# Patient Record
Sex: FEMALE | Race: WHITE | Employment: OTHER | URBAN - METROPOLITAN AREA
[De-identification: names, ages, dates, MRNs, and addresses within clinical notes are randomized per-mention and may not be internally consistent; named-entity substitution may affect disease eponyms.]

---

## 2017-06-15 ENCOUNTER — TELEPHONE (OUTPATIENT)
Dept: INTERNAL MEDICINE CLINIC | Age: 42
End: 2017-06-15

## 2017-11-02 ENCOUNTER — OFFICE VISIT (OUTPATIENT)
Dept: BARIATRICS/WEIGHT MGMT | Age: 42
End: 2017-11-02

## 2017-11-02 VITALS
HEIGHT: 64 IN | DIASTOLIC BLOOD PRESSURE: 74 MMHG | HEART RATE: 73 BPM | SYSTOLIC BLOOD PRESSURE: 129 MMHG | WEIGHT: 154.8 LBS | BODY MASS INDEX: 26.43 KG/M2 | RESPIRATION RATE: 16 BRPM

## 2017-11-02 DIAGNOSIS — Z98.84 HISTORY OF ROUX-EN-Y GASTRIC BYPASS: ICD-10-CM

## 2017-11-02 DIAGNOSIS — E66.01 MORBID OBESITY DUE TO EXCESS CALORIES (HCC): ICD-10-CM

## 2017-11-02 DIAGNOSIS — K45.8 INTERNAL HERNIA: Primary | ICD-10-CM

## 2017-11-02 PROCEDURE — 99205 OFFICE O/P NEW HI 60 MIN: CPT | Performed by: SURGERY

## 2017-11-02 PROCEDURE — 1036F TOBACCO NON-USER: CPT | Performed by: SURGERY

## 2017-11-02 PROCEDURE — G8427 DOCREV CUR MEDS BY ELIG CLIN: HCPCS | Performed by: SURGERY

## 2017-11-02 PROCEDURE — G8484 FLU IMMUNIZE NO ADMIN: HCPCS | Performed by: SURGERY

## 2017-11-02 PROCEDURE — G8419 CALC BMI OUT NRM PARAM NOF/U: HCPCS | Performed by: SURGERY

## 2017-11-02 NOTE — PROGRESS NOTES
syndrome     Ketoacidosis     8 times [id number]    MI, old     Neuropathy ([facility]     Obesity     Sleep apnea 11/13/2012    Stroke ([facility]     TIA (transient ischemic attack)     Unspecified sleep apnea     Urinary incontinence    . Review of Systems - ROS  Otherwise per HPI. Allergies: Allergies   Allergen Reactions    Latex     Cephalexin Anaphylaxis    Lyrica [Pregabalin]     Mirapex [Pramipexole] Anaphylaxis    Penicillins Anaphylaxis    Phenergan [Promethazine Hcl]     Prednisone Other (See Comments)     'Roid rage'    Sulfa Antibiotics Anaphylaxis    Ativan [Lorazepam]     Midol [Ibuprofen]     Nsaids      Gastric by-pass    Keflex [Cephalexin] Nausea And Vomiting    Meperidine Nausea And Vomiting     Drops blood pressure    Morphine Nausea And Vomiting    Promethazine Rash    Vicodin [Hydrocodone-Acetaminophen] Nausea And Vomiting    Vistaril [Hydroxyzine Hcl] Anxiety       Medications:  Current Outpatient Prescriptions   Medication Sig Dispense Refill    insulin detemir (LEVEMIR FLEXTOUCH) 100 UNIT/ML injection pen Inject 13 Units into the skin nightly 5 Pen 3    insulin detemir (LEVEMIR) 100 UNIT/ML injection pen Inject 13 Units into the skin every morning 5 Pen 3    clonazePAM (KLONOPIN) 2 MG tablet Take 2 mg by mouth 2 times daily as needed      NEXIUM 40 MG capsule TAKE 1 CAPSULE BY MOUTH EVERY MORNING (BEFORE BREAKFAST). 30 capsule 3    levothyroxine (LEVOTHROID) 50 MCG tablet Take 1 tablet by mouth daily 30 tablet 3    levothyroxine (LEVOTHROID) 200 MCG tablet Take 1 tablet by mouth daily 30 tablet 3    polyethylene glycol (GLYCOLAX) powder TAKE ONE CAPFUL ORALLY IN LIQUID AND DRINK ONCE DAILY.  255 g 3    topiramate (TOPAMAX) 100 MG tablet Take 1 tablet by mouth 2 times daily 60 tablet 3    solifenacin (VESICARE) 10 MG tablet Take 1 tablet by mouth daily 30 tablet 3    insulin aspart (NOVOLOG FLEXPEN) 100 UNIT/ML injection pen Inject 5 Units into the skin 3 times FICS  Member of the [address] [employer]    11/5/2017  6:34 AM

## 2017-11-03 ENCOUNTER — TELEPHONE (OUTPATIENT)
Dept: BARIATRICS/WEIGHT MGMT | Age: 42
End: 2017-11-03

## 2017-11-03 DIAGNOSIS — Z01.818 PREOP EXAMINATION: Primary | ICD-10-CM

## 2017-11-03 DIAGNOSIS — E03.9 HYPOTHYROIDISM, UNSPECIFIED TYPE: ICD-10-CM

## 2017-11-03 DIAGNOSIS — E66.9 DIABETES MELLITUS TYPE 2 IN OBESE (HCC): ICD-10-CM

## 2017-11-03 DIAGNOSIS — E66.01 MORBID OBESITY DUE TO EXCESS CALORIES (HCC): ICD-10-CM

## 2017-11-03 DIAGNOSIS — E11.69 DIABETES MELLITUS TYPE 2 IN OBESE (HCC): ICD-10-CM

## 2017-11-04 PROBLEM — E66.01 MORBID OBESITY DUE TO EXCESS CALORIES (HCC): Status: ACTIVE | Noted: 2017-11-04

## 2017-11-04 PROBLEM — K45.8 INTERNAL HERNIA: Status: ACTIVE | Noted: 2017-11-04

## 2017-11-04 PROBLEM — Z98.84 HISTORY OF ROUX-EN-Y GASTRIC BYPASS: Status: ACTIVE | Noted: 2017-11-04

## 2017-11-13 ENCOUNTER — HOSPITAL ENCOUNTER (OUTPATIENT)
Dept: CT IMAGING | Age: 42
Discharge: HOME | End: 2017-11-13
Attending: SURGERY | Admitting: SURGERY

## 2017-11-13 DIAGNOSIS — K45.8 OTHER SPECIFIED ABDOMINAL HERNIA WITHOUT OBSTRUCTION OR GANGRENE: ICD-10-CM

## 2017-11-13 DIAGNOSIS — K45.8 INTERNAL HERNIA: ICD-10-CM

## 2017-11-13 LAB
GFR AFRICAN AMERICAN: >60 ML/MIN/1.73M2
GFR NON-AFRICAN AMERICAN: >60 ML/MIN/1.73M2
POC CREATININE: 0.5 MG/DL (ref 0.6–1.1)

## 2017-11-13 RX ORDER — 0.9 % SODIUM CHLORIDE 0.9 %
10 VIAL (ML) INJECTION
Status: COMPLETED | OUTPATIENT
Start: 2017-11-13 | End: 2017-11-13

## 2017-11-13 RX ADMIN — Medication 10 ML: at 14:30

## 2017-11-15 ENCOUNTER — TELEPHONE (OUTPATIENT)
Dept: BARIATRICS/WEIGHT MGMT | Age: 42
End: 2017-11-15

## 2017-12-04 ENCOUNTER — OFFICE VISIT (OUTPATIENT)
Dept: BARIATRICS/WEIGHT MGMT | Age: 42
End: 2017-12-04

## 2017-12-04 VITALS
BODY MASS INDEX: 26.63 KG/M2 | WEIGHT: 156 LBS | SYSTOLIC BLOOD PRESSURE: 126 MMHG | RESPIRATION RATE: 12 BRPM | HEIGHT: 64 IN | HEART RATE: 80 BPM | DIASTOLIC BLOOD PRESSURE: 77 MMHG

## 2017-12-04 DIAGNOSIS — K43.6 INCARCERATED VENTRAL HERNIA: Primary | ICD-10-CM

## 2017-12-04 DIAGNOSIS — E66.3 OVERWEIGHT (BMI 25.0-29.9): ICD-10-CM

## 2017-12-04 PROCEDURE — G8427 DOCREV CUR MEDS BY ELIG CLIN: HCPCS | Performed by: SURGERY

## 2017-12-04 PROCEDURE — 99214 OFFICE O/P EST MOD 30 MIN: CPT | Performed by: SURGERY

## 2017-12-04 PROCEDURE — 1036F TOBACCO NON-USER: CPT | Performed by: SURGERY

## 2017-12-04 PROCEDURE — G8419 CALC BMI OUT NRM PARAM NOF/U: HCPCS | Performed by: SURGERY

## 2017-12-04 PROCEDURE — G8484 FLU IMMUNIZE NO ADMIN: HCPCS | Performed by: SURGERY

## 2017-12-04 NOTE — PROGRESS NOTES
bowel syndrome     Ketoacidosis     8 times [id number]    MI, old     Neuropathy ([facility] [address] [facility]     Obesity     Sleep apnea 11/13/2012    Stroke ([facility] [facility]     TIA (transient ischemic attack)     Unspecified sleep apnea     Urinary incontinence       Past Surgical History:   Procedure Laterality Date    BLADDER SURGERY      has a bladder stimulator    CHOLECYSTECTOMY      COLONOSCOPY  1/5/2016    poor colonic preparation throughout    COLONOSCOPY  1/19/2016    int hem    ENDOMETRIAL ABLATION      ENDOSCOPY, COLON, DIAGNOSTIC  1/5/2016    normal appearing cesia-en-y, biopsies to rule our celiac sprue    GASTRIC BYPASS SURGERY  3/11/2013    HERNIA REPAIR      KNEE ARTHROSCOPY Left     KNEE SURGERY      SMALL INTESTINE SURGERY      TUBAL LIGATION      UPPER GASTROINTESTINAL ENDOSCOPY       Current Outpatient Prescriptions   Medication Sig Dispense Refill    insulin detemir (LEVEMIR FLEXTOUCH) 100 UNIT/ML injection pen Inject 13 Units into the skin nightly 5 Pen 3    insulin detemir (LEVEMIR) 100 UNIT/ML injection pen Inject 13 Units into the skin every morning 5 Pen 3    clonazePAM (KLONOPIN) 2 MG tablet Take 2 mg by mouth 2 times daily as needed      NEXIUM 40 MG capsule TAKE 1 CAPSULE BY MOUTH EVERY MORNING (BEFORE BREAKFAST). 30 capsule 3    levothyroxine (LEVOTHROID) 50 MCG tablet Take 1 tablet by mouth daily 30 tablet 3    levothyroxine (LEVOTHROID) 200 MCG tablet Take 1 tablet by mouth daily 30 tablet 3    polyethylene glycol (GLYCOLAX) powder TAKE ONE CAPFUL ORALLY IN LIQUID AND DRINK ONCE DAILY.  255 g 3    topiramate (TOPAMAX) 100 MG tablet Take 1 tablet by mouth 2 times daily 60 tablet 3    solifenacin (VESICARE) 10 MG tablet Take 1 tablet by mouth daily 30 tablet 3    insulin aspart (NOVOLOG FLEXPEN) 100 UNIT/ML injection pen Inject 5 Units into the skin 3 times daily (before meals) Plus sliding scale 4 Pen 6    erythromycin with ethanol (THERAMYCIN) 2 % external solution Apply topically difficulty, light-headedness and numbness. Hematological: Negative for adenopathy. Does not bruise/bleed easily. OBJECTIVE:    /77 (Site: Right Arm, Position: Sitting, Cuff Size: Large Adult)   Pulse 80   Resp 12   Ht 5' 4\" (1.626 m)   Wt 156 lb (70.8 kg)   BMI 26.78 kg/m²      Physical Exam   Constitutional: She is oriented to person, place, and time. She appears well-developed and well-nourished. No distress. HENT:   Head: Normocephalic and atraumatic. Eyes: Conjunctivae and EOM are normal. Pupils are equal, round, and reactive to light. No scleral icterus. Neck: Normal range of motion. No JVD present. No tracheal deviation present. No thyromegaly present. Cardiovascular: Normal rate, regular rhythm, normal heart sounds and intact distal pulses. Exam reveals no gallop and no friction rub. No murmur heard. Pulmonary/Chest: Effort normal. No stridor. No respiratory distress. She has no wheezes. She has no rales. She exhibits no tenderness. Abdominal: Soft. Bowel sounds are normal. She exhibits mass. She exhibits no distension. There is tenderness. There is no rebound and no guarding. Musculoskeletal: Normal range of motion. She exhibits no edema or tenderness. Lymphadenopathy:     She has no cervical adenopathy. Neurological: She is alert and oriented to person, place, and time. No cranial nerve deficit. Coordination normal.   Skin: Skin is warm and dry. No rash noted. She is not diaphoretic. No erythema. No pallor. Psychiatric: Her behavior is normal. Judgment and thought content normal.   Vitals reviewed. ASSESSMENT & PLAN:    1. Incarcerated ventral hernia    2. Overweight (BMI 25.0-29. 9)           VENTRAL INCISIONAL HERNIA with incarcerated intestinal contents - will proceed with incisional VH repair with mesh. Patient counseled on the risks, benefits, and alternatives of treatment plan at length while in the office today.  Patient states an understanding and willingness to proceed with the plan. Follow Up:  Return in about 4 weeks (around 1/1/2018) for Surgery. Delmi Vasques MD, FACS, FICS.     12/4/17

## 2017-12-08 ASSESSMENT — ENCOUNTER SYMPTOMS
WHEEZING: 0
VOICE CHANGE: 0
PHOTOPHOBIA: 0
COLOR CHANGE: 0
SORE THROAT: 0
CONSTIPATION: 0
SHORTNESS OF BREATH: 0
ANAL BLEEDING: 0
TROUBLE SWALLOWING: 0
DIARRHEA: 0
COUGH: 0
VOMITING: 0
BLOOD IN STOOL: 0
NAUSEA: 0
ABDOMINAL PAIN: 1

## 2017-12-20 ENCOUNTER — TELEPHONE (OUTPATIENT)
Dept: BARIATRICS/WEIGHT MGMT | Age: 42
End: 2017-12-20

## 2017-12-20 NOTE — TELEPHONE ENCOUNTER
Called patient advised procedure scheduled 1/29. pretesting 1/22/18 at 1pm. Patient voiced understanding.

## 2018-01-09 LAB
AVERAGE GLUCOSE: NORMAL
HBA1C MFR BLD: 8 %

## 2018-01-10 LAB
CREATININE URINE: NORMAL MG/DL
MICROALBUMIN/CREAT 24H UR: 24.2 MG/G{CREAT}

## 2018-01-22 ENCOUNTER — HOSPITAL ENCOUNTER (OUTPATIENT)
Dept: PREADMISSION TESTING | Age: 43
Discharge: HOME | End: 2018-01-22
Attending: SURGERY | Admitting: SURGERY

## 2018-01-22 VITALS
DIASTOLIC BLOOD PRESSURE: 63 MMHG | HEIGHT: 64 IN | SYSTOLIC BLOOD PRESSURE: 134 MMHG | HEART RATE: 79 BPM | TEMPERATURE: 99.4 F | BODY MASS INDEX: 25.61 KG/M2 | RESPIRATION RATE: 16 BRPM | WEIGHT: 150 LBS | OXYGEN SATURATION: 100 %

## 2018-01-22 LAB
ANION GAP SERPL CALCULATED.3IONS-SCNC: 12 MMOL/L (ref 4–16)
BUN BLDV-MCNC: 11 MG/DL (ref 6–23)
CALCIUM SERPL-MCNC: 9.1 MG/DL (ref 8.3–10.6)
CHLORIDE BLD-SCNC: 101 MMOL/L (ref 99–110)
CO2: 22 MMOL/L (ref 21–32)
CREAT SERPL-MCNC: 0.7 MG/DL (ref 0.6–1.1)
EKG ATRIAL RATE: 66 BPM
EKG DIAGNOSIS: NORMAL
EKG P AXIS: 65 DEGREES
EKG P-R INTERVAL: 146 MS
EKG Q-T INTERVAL: 412 MS
EKG QRS DURATION: 86 MS
EKG QTC CALCULATION (BAZETT): 431 MS
EKG R AXIS: 11 DEGREES
EKG T AXIS: 49 DEGREES
EKG VENTRICULAR RATE: 66 BPM
GFR AFRICAN AMERICAN: >60 ML/MIN/1.73M2
GFR NON-AFRICAN AMERICAN: >60 ML/MIN/1.73M2
GLUCOSE BLD-MCNC: 251 MG/DL (ref 70–99)
HCT VFR BLD CALC: 35 % (ref 37–47)
HEMOGLOBIN: 12.1 GM/DL (ref 12.5–16)
MCH RBC QN AUTO: 30.9 PG (ref 27–31)
MCHC RBC AUTO-ENTMCNC: 34.6 % (ref 32–36)
MCV RBC AUTO: 89.5 FL (ref 78–100)
PDW BLD-RTO: 11.5 % (ref 11.7–14.9)
PLATELET # BLD: 233 K/CU MM (ref 140–440)
PMV BLD AUTO: 9.5 FL (ref 7.5–11.1)
POTASSIUM SERPL-SCNC: 4.2 MMOL/L (ref 3.5–5.1)
RBC # BLD: 3.91 M/CU MM (ref 4.2–5.4)
SODIUM BLD-SCNC: 135 MMOL/L (ref 135–145)
WBC # BLD: 5.5 K/CU MM (ref 4–10.5)

## 2018-01-22 RX ORDER — CALCIUM CARBONATE 500(1250)
1500 TABLET ORAL DAILY
COMMUNITY
End: 2022-06-20

## 2018-01-22 RX ORDER — OXCARBAZEPINE 600 MG/1
600 TABLET, FILM COATED ORAL 2 TIMES DAILY
COMMUNITY

## 2018-01-22 RX ORDER — MELATONIN 10 MG
1 TABLET, SUBLINGUAL SUBLINGUAL DAILY
COMMUNITY

## 2018-01-22 RX ORDER — PENTOXIFYLLINE 400 MG/1
400 TABLET, EXTENDED RELEASE ORAL
COMMUNITY
End: 2022-09-19 | Stop reason: SDUPTHER

## 2018-01-22 RX ORDER — BUSPIRONE HYDROCHLORIDE 10 MG/1
10 TABLET ORAL 2 TIMES DAILY
COMMUNITY
End: 2022-06-20

## 2018-01-22 RX ORDER — LEVOTHYROXINE SODIUM 0.05 MG/1
50 TABLET ORAL DAILY
COMMUNITY
End: 2022-06-20

## 2018-01-22 RX ORDER — VANCOMYCIN HYDROCHLORIDE 1 G/200ML
1000 INJECTION, SOLUTION INTRAVENOUS ONCE
Status: CANCELLED | OUTPATIENT
Start: 2018-01-29

## 2018-01-22 RX ORDER — LEVOTHYROXINE SODIUM 175 UG/1
175 TABLET ORAL DAILY
COMMUNITY
End: 2022-06-20 | Stop reason: SDUPTHER

## 2018-01-22 RX ORDER — EVENING PRIMROSE OIL 500 MG
1 CAPSULE ORAL DAILY
COMMUNITY
End: 2022-06-20

## 2018-01-22 ASSESSMENT — PAIN DESCRIPTION - FREQUENCY: FREQUENCY: CONTINUOUS

## 2018-01-22 ASSESSMENT — PAIN DESCRIPTION - PAIN TYPE: TYPE: CHRONIC PAIN

## 2018-01-22 ASSESSMENT — PAIN SCALES - GENERAL: PAINLEVEL_OUTOF10: 4

## 2018-01-30 ENCOUNTER — TELEPHONE (OUTPATIENT)
Dept: BARIATRICS/WEIGHT MGMT | Age: 43
End: 2018-01-30

## 2018-01-30 DIAGNOSIS — Z98.890 S/P REPAIR OF VENTRAL HERNIA: Primary | ICD-10-CM

## 2018-01-30 DIAGNOSIS — Z87.19 S/P REPAIR OF VENTRAL HERNIA: Primary | ICD-10-CM

## 2018-01-30 RX ORDER — OXYCODONE HYDROCHLORIDE 15 MG/1
1 TABLET, FILM COATED, EXTENDED RELEASE ORAL EVERY 12 HOURS
Qty: 4 TABLET | Refills: 0 | Status: SHIPPED | OUTPATIENT
Start: 2018-01-30 | End: 2018-02-01

## 2018-01-30 NOTE — PROGRESS NOTES
Patient called in stating her incisional pain is not controlled with percocet 2 tabs every 6 hours. No new pain, incisions healing well and no n/v. Spoke with Dr. Madison Goode, instructed to give patient oxycontin 15 mg BID and he will see patient tomorrow. Patient was instructed to STOP taking percocet's and instructed on dosage of oxycontin and to only take as prescribed. Patient verbalizes and picked up prescription. Controlled Substances Monitoring:     Attestation: The Prescription Monitoring Report for this patient was reviewed today. Allyson Villagran CNP)  Documentation: Possible medication side effects, risk of tolerance and/or dependence, and alternative treatments discussed. , Potential drug abuse or diversion identified, see note documentation., Obtaining appropriate analgesic effect of treatment., No signs of potential drug abuse or diversion identified. Allyson Villagran CNP)  Acute Pain Prescriptions: Severe pain not adequately treated with lower dose.  Allyson Villagran CNP)

## 2018-01-31 ENCOUNTER — OFFICE VISIT (OUTPATIENT)
Dept: BARIATRICS/WEIGHT MGMT | Age: 43
End: 2018-01-31

## 2018-01-31 VITALS
OXYGEN SATURATION: 97 % | BODY MASS INDEX: 25.74 KG/M2 | WEIGHT: 154.5 LBS | HEART RATE: 91 BPM | DIASTOLIC BLOOD PRESSURE: 64 MMHG | SYSTOLIC BLOOD PRESSURE: 112 MMHG | HEIGHT: 65 IN

## 2018-01-31 DIAGNOSIS — Z98.890 S/P HERNIA REPAIR: Primary | ICD-10-CM

## 2018-01-31 DIAGNOSIS — K43.9 VENTRAL HERNIA WITHOUT OBSTRUCTION OR GANGRENE: ICD-10-CM

## 2018-01-31 DIAGNOSIS — Z87.19 S/P HERNIA REPAIR: Primary | ICD-10-CM

## 2018-01-31 PROCEDURE — 99024 POSTOP FOLLOW-UP VISIT: CPT | Performed by: SURGERY

## 2018-01-31 RX ORDER — OXYCODONE HYDROCHLORIDE 30 MG/1
30 TABLET, FILM COATED, EXTENDED RELEASE ORAL EVERY 12 HOURS
Qty: 14 EACH | Refills: 0 | Status: SHIPPED | OUTPATIENT
Start: 2018-01-31 | End: 2018-02-07

## 2018-01-31 RX ORDER — OXYCODONE AND ACETAMINOPHEN 7.5; 325 MG/1; MG/1
1 TABLET ORAL EVERY 4 HOURS PRN
Qty: 20 TABLET | Refills: 0 | Status: SHIPPED | OUTPATIENT
Start: 2018-01-31 | End: 2018-02-07

## 2018-02-14 ENCOUNTER — OFFICE VISIT (OUTPATIENT)
Dept: BARIATRICS/WEIGHT MGMT | Age: 43
End: 2018-02-14

## 2018-02-14 VITALS
HEIGHT: 64 IN | BODY MASS INDEX: 24.7 KG/M2 | WEIGHT: 144.7 LBS | SYSTOLIC BLOOD PRESSURE: 110 MMHG | DIASTOLIC BLOOD PRESSURE: 64 MMHG

## 2018-02-14 DIAGNOSIS — Z87.19 S/P VENTRAL HERNIORRHAPHY: Primary | ICD-10-CM

## 2018-02-14 DIAGNOSIS — Z98.890 S/P VENTRAL HERNIORRHAPHY: Primary | ICD-10-CM

## 2018-02-14 PROCEDURE — 99024 POSTOP FOLLOW-UP VISIT: CPT | Performed by: SURGERY

## 2018-02-14 NOTE — PROGRESS NOTES
preparation throughout    COLONOSCOPY  1/19/2016    int hem    ENDOMETRIAL ABLATION  2015    ENDOSCOPY, COLON, DIAGNOSTIC  1/5/2016    normal appearing adrian-en-y, biopsies to rule out celiac sprue    GASTRIC BYPASS SURGERY  03/11/2013    Adrian-en-y surgery    HERNIA REPAIR      KNEE ARTHROSCOPY Left 2014    LIPECTOMY  2015    SMALL INTESTINE SURGERY  05/2016    internal hernia surgery then bowel resection and then abscess drainage --all in same month   [facility]  01/29/2018    Robotic laparoscopic ventral hernia repair with mesh    WISDOM TOOTH EXTRACTION       Current Outpatient Prescriptions   Medication Sig Dispense Refill    OXcarbazepine (TRILEPTAL) 600 MG tablet Take 600 mg by mouth 2 times daily      topiramate (TOPAMAX) 200 MG tablet Take 200 mg by mouth 3 times daily      Insulin Regular Human (AFREZZA) 4 units POWD Inhale 1 Cartridge into the lungs 4 times daily      Insulin Regular Human (AFREZZA) 12 units POWD Inhale 1 Cartridge into the lungs 4 times daily Pt takes only 8 units at dinner time and adjusts dose at bedtime due to bld sugar      Cholecalciferol (VITAMIN D3) 64226 units TABS Take 1 tablet by mouth daily      levothyroxine (SYNTHROID) 175 MCG tablet Take 175 mcg by mouth Daily      levothyroxine (SYNTHROID) 50 MCG tablet Take 50 mcg by mouth Daily      Biotin 19711 MCG TBDP Take 1 tablet by mouth daily      Evening Primrose Oil 500 MG CAPS Take 1 capsule by mouth daily      pentoxifylline (TRENTAL) 400 MG extended release tablet Take 400 mg by mouth 3 times daily (with meals)      calcium carbonate (OSCAL) 500 MG TABS tablet Take 1,500 mg by mouth daily      busPIRone (BUSPAR) 10 MG tablet Take 10 mg by mouth 2 times daily      insulin detemir (LEVEMIR FLEXTOUCH) 100 UNIT/ML injection pen Inject 13 Units into the skin nightly (Patient taking differently: Inject 8 Units into the skin 2 times

## 2018-03-05 ENCOUNTER — TELEPHONE (OUTPATIENT)
Dept: BARIATRICS/WEIGHT MGMT | Age: 43
End: 2018-03-05

## 2018-03-07 ENCOUNTER — OFFICE VISIT (OUTPATIENT)
Dept: BARIATRICS/WEIGHT MGMT | Age: 43
End: 2018-03-07

## 2018-03-07 VITALS
WEIGHT: 145.7 LBS | SYSTOLIC BLOOD PRESSURE: 114 MMHG | DIASTOLIC BLOOD PRESSURE: 66 MMHG | BODY MASS INDEX: 24.87 KG/M2 | HEART RATE: 80 BPM | HEIGHT: 64 IN

## 2018-03-07 DIAGNOSIS — R10.12 LEFT UPPER QUADRANT PAIN: Primary | ICD-10-CM

## 2018-03-07 PROCEDURE — 99024 POSTOP FOLLOW-UP VISIT: CPT | Performed by: SURGERY

## 2018-03-07 RX ORDER — ONDANSETRON 8 MG/1
8 TABLET, ORALLY DISINTEGRATING ORAL EVERY 8 HOURS PRN
Qty: 30 TABLET | Refills: 2 | Status: SHIPPED | OUTPATIENT
Start: 2018-03-07 | End: 2022-06-20

## 2018-03-07 RX ORDER — OXYCODONE HYDROCHLORIDE AND ACETAMINOPHEN 5; 325 MG/1; MG/1
1-2 TABLET ORAL EVERY 6 HOURS PRN
Qty: 25 TABLET | Refills: 0 | Status: SHIPPED | OUTPATIENT
Start: 2018-03-07 | End: 2018-03-14

## 2018-03-07 ASSESSMENT — ENCOUNTER SYMPTOMS
PHOTOPHOBIA: 0
VOICE CHANGE: 0
DIARRHEA: 0
COLOR CHANGE: 0
SORE THROAT: 0
BACK PAIN: 1
BLOOD IN STOOL: 0
ABDOMINAL DISTENTION: 1
SHORTNESS OF BREATH: 1
COUGH: 0
WHEEZING: 0
CONSTIPATION: 0
NAUSEA: 0
ANAL BLEEDING: 0
VOMITING: 0
ABDOMINAL PAIN: 1
TROUBLE SWALLOWING: 0

## 2018-03-07 NOTE — PROGRESS NOTES
Units into the skin nightly (Patient taking differently: Inject 8 Units into the skin 2 times daily 8 units at 9 AM and 10 units at 9pm) 5 Pen 3    clonazePAM (KLONOPIN) 2 MG tablet Take 2 mg by mouth 2 times daily as needed      NEXIUM 40 MG capsule TAKE 1 CAPSULE BY MOUTH EVERY MORNING (BEFORE BREAKFAST). (Patient taking differently: Take 40 mg by mouth every morning (before breakfast) TAKE 1 CAPSULE BY MOUTH EVERY MORNING (BEFORE BREAKFAST). ) 30 capsule 3    polyethylene glycol (GLYCOLAX) powder TAKE ONE CAPFUL ORALLY IN LIQUID AND DRINK ONCE DAILY. 255 g 3    solifenacin (VESICARE) 10 MG tablet Take 1 tablet by mouth daily 30 tablet 3    Cyanocobalamin (VITAMIN B-12) 500 MCG SUBL Place 1 tablet under the tongue daily.  paroxetine (PAXIL) 40 MG tablet Take 40 mg by mouth every morning        No current facility-administered medications for this visit. Allergies   Allergen Reactions    Latex     Cephalexin Anaphylaxis    Lyrica [Pregabalin]     Mirapex [Pramipexole] Anaphylaxis    Penicillins Anaphylaxis    Prednisone Other (See Comments)     'Roid rage'  And severe bld sugar elevation    Sulfa Antibiotics Anaphylaxis    Midol [Ibuprofen] Other (See Comments)     Ataxia--reacted with Trileptal--couldn't walk    Phenergan [Promethazine Hcl] Other (See Comments)     Violent body aches    Ativan [Lorazepam] Other (See Comments)     Causes manic behavior    Keflex [Cephalexin] Nausea And Vomiting    Meperidine Nausea And Vomiting     Drops blood pressure    Morphine Nausea And Vomiting    Nsaids Other (See Comments)     Gastric by-pass    Vicodin [Hydrocodone-Acetaminophen] Nausea And Vomiting    Vistaril [Hydroxyzine Hcl] Anxiety           Review of Systems   Constitutional: Positive for fatigue. Negative for activity change, chills, diaphoresis and fever. HENT: Negative for sore throat, trouble swallowing and voice change. Eyes: Negative for photophobia and visual disturbance. plan.

## 2018-03-28 ENCOUNTER — OFFICE VISIT (OUTPATIENT)
Dept: BARIATRICS/WEIGHT MGMT | Age: 43
End: 2018-03-28

## 2018-03-28 VITALS
HEART RATE: 76 BPM | SYSTOLIC BLOOD PRESSURE: 103 MMHG | WEIGHT: 143.3 LBS | OXYGEN SATURATION: 98 % | DIASTOLIC BLOOD PRESSURE: 67 MMHG | BODY MASS INDEX: 24.46 KG/M2 | HEIGHT: 64 IN

## 2018-03-28 DIAGNOSIS — R10.12 LUQ PAIN: Primary | ICD-10-CM

## 2018-03-28 PROCEDURE — 99024 POSTOP FOLLOW-UP VISIT: CPT | Performed by: SURGERY

## 2018-03-28 RX ORDER — ONDANSETRON 4 MG/1
4 TABLET, ORALLY DISINTEGRATING ORAL EVERY 4 HOURS PRN
Qty: 30 TABLET | Refills: 3 | Status: SHIPPED | OUTPATIENT
Start: 2018-03-28 | End: 2022-10-18 | Stop reason: SDUPTHER

## 2018-05-04 ENCOUNTER — HOSPITAL ENCOUNTER (OUTPATIENT)
Dept: CT IMAGING | Age: 43
Discharge: HOME | End: 2018-05-04
Attending: FAMILY MEDICINE | Admitting: FAMILY MEDICINE

## 2018-05-04 DIAGNOSIS — G93.40 ENCEPHALOPATHY: ICD-10-CM

## 2018-05-04 LAB
GFR AFRICAN AMERICAN: >60 ML/MIN/1.73M2
GFR NON-AFRICAN AMERICAN: >60 ML/MIN/1.73M2
POC CREATININE: 0.6 MG/DL (ref 0.6–1.1)

## 2018-05-04 RX ORDER — SODIUM CHLORIDE 9 MG/ML
100 INJECTION, SOLUTION INTRAVENOUS ONCE
Status: COMPLETED | OUTPATIENT
Start: 2018-05-04 | End: 2018-05-04

## 2018-05-04 RX ORDER — SODIUM CHLORIDE 0.9 % (FLUSH) 0.9 %
10 SYRINGE (ML) INJECTION ONCE
Status: COMPLETED | OUTPATIENT
Start: 2018-05-04 | End: 2018-05-04

## 2018-05-04 RX ADMIN — Medication 10 ML: at 14:37

## 2018-05-04 RX ADMIN — SODIUM CHLORIDE 100 ML/HR: 9 INJECTION, SOLUTION INTRAVENOUS at 14:38

## 2018-05-08 ENCOUNTER — HOSPITAL ENCOUNTER (OUTPATIENT)
Dept: OTHER | Age: 43
Discharge: HOME | End: 2018-05-08
Attending: INTERNAL MEDICINE | Admitting: INTERNAL MEDICINE

## 2018-05-09 ENCOUNTER — HOSPITAL ENCOUNTER (OUTPATIENT)
Dept: LAB | Age: 43
Discharge: HOME | End: 2018-05-09
Attending: INTERNAL MEDICINE | Admitting: INTERNAL MEDICINE

## 2018-05-09 LAB
ALBUMIN SERPL-MCNC: 4.2 GM/DL (ref 3.4–5)
ALP BLD-CCNC: 89 IU/L (ref 40–128)
ALT SERPL-CCNC: 43 U/L (ref 10–40)
ANION GAP SERPL CALCULATED.3IONS-SCNC: 13 MMOL/L (ref 4–16)
AST SERPL-CCNC: 30 IU/L (ref 15–37)
BACTERIA: NEGATIVE /HPF
BILIRUB SERPL-MCNC: 0.2 MG/DL (ref 0–1)
BILIRUBIN URINE: NEGATIVE MG/DL
BLOOD, URINE: NEGATIVE
BUN BLDV-MCNC: 13 MG/DL (ref 6–23)
CALCIUM SERPL-MCNC: 9.1 MG/DL (ref 8.3–10.6)
CHLORIDE BLD-SCNC: 107 MMOL/L (ref 99–110)
CLARITY: CLEAR
CO2: 20 MMOL/L (ref 21–32)
COLOR: YELLOW
CREAT SERPL-MCNC: 0.8 MG/DL (ref 0.6–1.1)
GFR AFRICAN AMERICAN: >60 ML/MIN/1.73M2
GFR NON-AFRICAN AMERICAN: >60 ML/MIN/1.73M2
GLUCOSE BLD-MCNC: 119 MG/DL (ref 70–99)
GLUCOSE, URINE: NEGATIVE MG/DL
HYALINE CASTS: 0 /LPF
KETONES, URINE: NEGATIVE MG/DL
LEUKOCYTE ESTERASE, URINE: ABNORMAL
Lab: 24 HRS
MUCUS: ABNORMAL HPF
NITRITE URINE, QUANTITATIVE: NEGATIVE
PH, URINE: 5 (ref 5–8)
POTASSIUM SERPL-SCNC: 3.6 MMOL/L (ref 3.5–5.1)
PROTEIN 24 HOUR URINE: 81 MG/24 HR
PROTEIN UA: NEGATIVE MG/DL
RBC URINE: ABNORMAL /HPF (ref 0–6)
SODIUM BLD-SCNC: 140 MMOL/L (ref 135–145)
SPECIFIC GRAVITY UA: 1.02 (ref 1–1.03)
SQUAMOUS EPITHELIAL: 1 /HPF
TOTAL PROTEIN: 6.4 GM/DL (ref 6.4–8.2)
TRICHOMONAS: ABNORMAL /HPF
UROBILINOGEN, URINE: NORMAL MG/DL (ref 0.2–1)
VOLUME, (UVOL): 400 MLS
WBC UA: <1 /HPF (ref 0–5)

## 2020-05-27 ENCOUNTER — HOSPITAL ENCOUNTER (OUTPATIENT)
Age: 45
Discharge: HOME | End: 2020-05-27
Payer: MEDICARE

## 2020-05-27 LAB
ALBUMIN SERPL-MCNC: 4 GM/DL (ref 3.4–5)
ANION GAP SERPL CALCULATED.3IONS-SCNC: 13 MMOL/L (ref 4–16)
BACTERIA: NEGATIVE /HPF
BILIRUBIN URINE: NEGATIVE MG/DL
BLOOD, URINE: NEGATIVE
BUN BLDV-MCNC: 12 MG/DL (ref 6–23)
CALCIUM SERPL-MCNC: 9.2 MG/DL (ref 8.3–10.6)
CHLORIDE BLD-SCNC: 105 MMOL/L (ref 99–110)
CLARITY: CLEAR
CO2: 23 MMOL/L (ref 21–32)
COLOR: YELLOW
CREAT SERPL-MCNC: 0.7 MG/DL (ref 0.6–1.1)
GFR AFRICAN AMERICAN: >60 ML/MIN/1.73M2
GFR NON-AFRICAN AMERICAN: >60 ML/MIN/1.73M2
GLUCOSE BLD-MCNC: 119 MG/DL (ref 70–99)
GLUCOSE, URINE: NEGATIVE MG/DL
KETONES, URINE: NEGATIVE MG/DL
LEUKOCYTE ESTERASE, URINE: ABNORMAL
MUCUS: ABNORMAL HPF
NITRITE URINE, QUANTITATIVE: NEGATIVE
PH, URINE: 7 (ref 5–8)
PHOSPHORUS: 3.6 MG/DL (ref 2.5–4.9)
POTASSIUM SERPL-SCNC: 4.4 MMOL/L (ref 3.5–5.1)
PROTEIN UA: 30 MG/DL
RBC URINE: <1 /HPF (ref 0–6)
SODIUM BLD-SCNC: 141 MMOL/L (ref 135–145)
SPECIFIC GRAVITY UA: 1.02 (ref 1–1.03)
SQUAMOUS EPITHELIAL: <1 /HPF
TRICHOMONAS: ABNORMAL /HPF
UROBILINOGEN, URINE: NORMAL MG/DL (ref 0.2–1)
WBC UA: <1 /HPF (ref 0–5)

## 2020-05-27 PROCEDURE — 81001 URINALYSIS AUTO W/SCOPE: CPT

## 2020-05-27 PROCEDURE — 36415 COLL VENOUS BLD VENIPUNCTURE: CPT

## 2020-05-27 PROCEDURE — 80069 RENAL FUNCTION PANEL: CPT

## 2021-02-24 ENCOUNTER — HOSPITAL ENCOUNTER (OUTPATIENT)
Age: 46
Discharge: HOME | End: 2021-02-24
Payer: MEDICARE

## 2021-02-24 LAB
ALBUMIN SERPL-MCNC: 3.6 GM/DL (ref 3.4–5)
ALP BLD-CCNC: 80 IU/L (ref 40–129)
ALT SERPL-CCNC: 13 U/L (ref 10–40)
ANION GAP SERPL CALCULATED.3IONS-SCNC: 8 MMOL/L (ref 4–16)
AST SERPL-CCNC: 15 IU/L (ref 15–37)
BASOPHILS ABSOLUTE: 0.1 K/CU MM
BASOPHILS RELATIVE PERCENT: 0.7 % (ref 0–1)
BILIRUB SERPL-MCNC: 0.2 MG/DL (ref 0–1)
BUN BLDV-MCNC: 12 MG/DL (ref 6–23)
CALCIUM SERPL-MCNC: 9 MG/DL (ref 8.3–10.6)
CHLORIDE BLD-SCNC: 108 MMOL/L (ref 99–110)
CO2: 22 MMOL/L (ref 21–32)
CREAT SERPL-MCNC: 0.6 MG/DL (ref 0.6–1.1)
DIFFERENTIAL TYPE: ABNORMAL
EOSINOPHILS ABSOLUTE: 0.1 K/CU MM
EOSINOPHILS RELATIVE PERCENT: 1 % (ref 0–3)
GFR AFRICAN AMERICAN: >60 ML/MIN/1.73M2
GFR NON-AFRICAN AMERICAN: >60 ML/MIN/1.73M2
GLUCOSE BLD-MCNC: 172 MG/DL (ref 70–99)
HCT VFR BLD CALC: 36.7 % (ref 37–47)
HEMOGLOBIN: 12.3 GM/DL (ref 12.5–16)
IMMATURE NEUTROPHIL %: 0.3 % (ref 0–0.43)
LYMPHOCYTES ABSOLUTE: 2.8 K/CU MM
LYMPHOCYTES RELATIVE PERCENT: 40.9 % (ref 24–44)
MCH RBC QN AUTO: 30.1 PG (ref 27–31)
MCHC RBC AUTO-ENTMCNC: 33.5 % (ref 32–36)
MCV RBC AUTO: 90 FL (ref 78–100)
MONOCYTES ABSOLUTE: 0.4 K/CU MM
MONOCYTES RELATIVE PERCENT: 6 % (ref 0–4)
NUCLEATED RBC %: 0 %
PDW BLD-RTO: 11.6 % (ref 11.7–14.9)
PLATELET # BLD: 283 K/CU MM (ref 140–440)
PMV BLD AUTO: 10.7 FL (ref 7.5–11.1)
POTASSIUM SERPL-SCNC: 4.2 MMOL/L (ref 3.5–5.1)
RBC # BLD: 4.08 M/CU MM (ref 4.2–5.4)
SEGMENTED NEUTROPHILS ABSOLUTE COUNT: 3.5 K/CU MM
SEGMENTED NEUTROPHILS RELATIVE PERCENT: 51.1 % (ref 36–66)
SODIUM BLD-SCNC: 138 MMOL/L (ref 135–145)
TOTAL IMMATURE NEUTOROPHIL: 0.02 K/CU MM
TOTAL NUCLEATED RBC: 0 K/CU MM
TOTAL PROTEIN: 6.1 GM/DL (ref 6.4–8.2)
WBC # BLD: 6.9 K/CU MM (ref 4–10.5)

## 2021-02-24 PROCEDURE — 36415 COLL VENOUS BLD VENIPUNCTURE: CPT

## 2021-02-24 PROCEDURE — 87086 URINE CULTURE/COLONY COUNT: CPT

## 2021-02-24 PROCEDURE — 80053 COMPREHEN METABOLIC PANEL: CPT

## 2021-02-24 PROCEDURE — 85025 COMPLETE CBC W/AUTO DIFF WBC: CPT

## 2021-02-25 LAB
CULTURE: NORMAL
Lab: NORMAL
SPECIMEN: NORMAL

## 2021-07-27 PROBLEM — E10.21 DIABETIC NEPHROPATHY ASSOCIATED WITH TYPE 1 DIABETES MELLITUS (HCC): Status: ACTIVE | Noted: 2021-07-27

## 2021-07-27 PROBLEM — R80.1 PERSISTENT PROTEINURIA: Status: ACTIVE | Noted: 2021-07-27

## 2022-01-11 ENCOUNTER — HOSPITAL ENCOUNTER (OUTPATIENT)
Age: 47
Discharge: HOME | End: 2022-01-11
Payer: MEDICARE

## 2022-01-11 DIAGNOSIS — R80.1 PERSISTENT PROTEINURIA: ICD-10-CM

## 2022-01-11 DIAGNOSIS — E10.21 DIABETIC NEPHROPATHY ASSOCIATED WITH TYPE 1 DIABETES MELLITUS (HCC): ICD-10-CM

## 2022-01-11 LAB
ALBUMIN SERPL-MCNC: 3.9 GM/DL (ref 3.4–5)
ALP BLD-CCNC: 118 IU/L (ref 40–128)
ALT SERPL-CCNC: 34 U/L (ref 10–40)
ANION GAP SERPL CALCULATED.3IONS-SCNC: 8 MMOL/L (ref 4–16)
AST SERPL-CCNC: 39 IU/L (ref 15–37)
BILIRUB SERPL-MCNC: 0.2 MG/DL (ref 0–1)
BUN BLDV-MCNC: 12 MG/DL (ref 6–23)
CALCIUM SERPL-MCNC: 8.7 MG/DL (ref 8.3–10.6)
CHLORIDE BLD-SCNC: 109 MMOL/L (ref 99–110)
CO2: 24 MMOL/L (ref 21–32)
CREAT SERPL-MCNC: 0.6 MG/DL (ref 0.6–1.1)
CREATININE URINE: 73.7 MG/DL (ref 28–217)
GFR AFRICAN AMERICAN: >60 ML/MIN/1.73M2
GFR NON-AFRICAN AMERICAN: >60 ML/MIN/1.73M2
GLUCOSE BLD-MCNC: 86 MG/DL (ref 70–99)
POTASSIUM SERPL-SCNC: 4.2 MMOL/L (ref 3.5–5.1)
PROT/CREAT RATIO, UR: 0.2
SODIUM BLD-SCNC: 141 MMOL/L (ref 135–145)
TOTAL PROTEIN: 6.1 GM/DL (ref 6.4–8.2)
URINE TOTAL PROTEIN: 15.6 MG/DL

## 2022-01-11 PROCEDURE — 82570 ASSAY OF URINE CREATININE: CPT

## 2022-01-11 PROCEDURE — 84156 ASSAY OF PROTEIN URINE: CPT

## 2022-01-11 PROCEDURE — 80053 COMPREHEN METABOLIC PANEL: CPT

## 2022-01-11 PROCEDURE — 36415 COLL VENOUS BLD VENIPUNCTURE: CPT

## 2022-06-20 ENCOUNTER — OFFICE VISIT (OUTPATIENT)
Dept: INTERNAL MEDICINE CLINIC | Age: 47
End: 2022-06-20
Payer: MEDICARE

## 2022-06-20 VITALS
DIASTOLIC BLOOD PRESSURE: 72 MMHG | HEIGHT: 64 IN | BODY MASS INDEX: 21.85 KG/M2 | OXYGEN SATURATION: 98 % | WEIGHT: 128 LBS | HEART RATE: 70 BPM | SYSTOLIC BLOOD PRESSURE: 118 MMHG

## 2022-06-20 DIAGNOSIS — N39.41 URGE INCONTINENCE: ICD-10-CM

## 2022-06-20 DIAGNOSIS — E10.65 TYPE 1 DIABETES MELLITUS WITH HYPERGLYCEMIA (HCC): Primary | ICD-10-CM

## 2022-06-20 DIAGNOSIS — K59.00 CONSTIPATION, UNSPECIFIED CONSTIPATION TYPE: ICD-10-CM

## 2022-06-20 DIAGNOSIS — I10 PRIMARY HYPERTENSION: ICD-10-CM

## 2022-06-20 DIAGNOSIS — F41.9 ANXIETY: ICD-10-CM

## 2022-06-20 DIAGNOSIS — G47.00 INSOMNIA, UNSPECIFIED TYPE: ICD-10-CM

## 2022-06-20 DIAGNOSIS — Z12.31 ENCOUNTER FOR SCREENING MAMMOGRAM FOR MALIGNANT NEOPLASM OF BREAST: ICD-10-CM

## 2022-06-20 DIAGNOSIS — F31.9 BIPOLAR 1 DISORDER (HCC): ICD-10-CM

## 2022-06-20 DIAGNOSIS — Z01.419 ENCOUNTER FOR GYNECOLOGICAL EXAMINATION WITHOUT ABNORMAL FINDING: ICD-10-CM

## 2022-06-20 DIAGNOSIS — R06.00 DYSPNEA, UNSPECIFIED TYPE: ICD-10-CM

## 2022-06-20 DIAGNOSIS — E03.4 HYPOTHYROIDISM DUE TO ACQUIRED ATROPHY OF THYROID: ICD-10-CM

## 2022-06-20 DIAGNOSIS — F42.9 OBSESSIVE-COMPULSIVE DISORDER, UNSPECIFIED TYPE: ICD-10-CM

## 2022-06-20 DIAGNOSIS — G43.909 MIGRAINE WITHOUT STATUS MIGRAINOSUS, NOT INTRACTABLE, UNSPECIFIED MIGRAINE TYPE: ICD-10-CM

## 2022-06-20 LAB
CHP ED QC CHECK: NORMAL
GLUCOSE BLD-MCNC: 241 MG/DL
TSH SERPL DL<=0.05 MIU/L-ACNC: 1.13 UIU/ML (ref 0.27–4.2)

## 2022-06-20 PROCEDURE — G8420 CALC BMI NORM PARAMETERS: HCPCS | Performed by: INTERNAL MEDICINE

## 2022-06-20 PROCEDURE — 82962 GLUCOSE BLOOD TEST: CPT | Performed by: INTERNAL MEDICINE

## 2022-06-20 PROCEDURE — G8427 DOCREV CUR MEDS BY ELIG CLIN: HCPCS | Performed by: INTERNAL MEDICINE

## 2022-06-20 PROCEDURE — 36415 COLL VENOUS BLD VENIPUNCTURE: CPT | Performed by: INTERNAL MEDICINE

## 2022-06-20 PROCEDURE — 3046F HEMOGLOBIN A1C LEVEL >9.0%: CPT | Performed by: INTERNAL MEDICINE

## 2022-06-20 PROCEDURE — 2022F DILAT RTA XM EVC RTNOPTHY: CPT | Performed by: INTERNAL MEDICINE

## 2022-06-20 PROCEDURE — 1036F TOBACCO NON-USER: CPT | Performed by: INTERNAL MEDICINE

## 2022-06-20 PROCEDURE — 99204 OFFICE O/P NEW MOD 45 MIN: CPT | Performed by: INTERNAL MEDICINE

## 2022-06-20 RX ORDER — OXYBUTYNIN CHLORIDE 10 MG/1
10 TABLET, EXTENDED RELEASE ORAL DAILY
Qty: 30 TABLET | Refills: 3 | Status: SHIPPED | OUTPATIENT
Start: 2022-06-20 | End: 2022-10-18 | Stop reason: SDUPTHER

## 2022-06-20 RX ORDER — SODIUM CHLORIDE 1000 MG
1 TABLET, SOLUBLE MISCELLANEOUS 2 TIMES DAILY
COMMUNITY
End: 2022-09-27

## 2022-06-20 RX ORDER — ROSUVASTATIN CALCIUM 5 MG/1
5 TABLET, COATED ORAL DAILY
COMMUNITY

## 2022-06-20 RX ORDER — MELATONIN 10 MG
10 CAPSULE ORAL NIGHTLY
COMMUNITY

## 2022-06-20 RX ORDER — OXYBUTYNIN CHLORIDE 10 MG/1
10 TABLET, EXTENDED RELEASE ORAL DAILY
COMMUNITY
End: 2022-06-20 | Stop reason: SDUPTHER

## 2022-06-20 RX ORDER — LEVOTHYROXINE SODIUM 175 UG/1
175 TABLET ORAL DAILY
Qty: 30 TABLET | Refills: 3 | Status: SHIPPED | OUTPATIENT
Start: 2022-06-20 | End: 2022-10-18 | Stop reason: SDUPTHER

## 2022-06-20 RX ORDER — ALBUTEROL SULFATE 90 UG/1
2 AEROSOL, METERED RESPIRATORY (INHALATION) EVERY 6 HOURS PRN
COMMUNITY
Start: 2022-05-18 | End: 2022-06-20 | Stop reason: SDUPTHER

## 2022-06-20 RX ORDER — ALBUTEROL SULFATE 90 UG/1
2 AEROSOL, METERED RESPIRATORY (INHALATION) EVERY 6 HOURS PRN
Qty: 1 EACH | Refills: 3 | Status: SHIPPED | OUTPATIENT
Start: 2022-06-20 | End: 2022-10-18 | Stop reason: SDUPTHER

## 2022-06-20 SDOH — ECONOMIC STABILITY: FOOD INSECURITY: WITHIN THE PAST 12 MONTHS, THE FOOD YOU BOUGHT JUST DIDN'T LAST AND YOU DIDN'T HAVE MONEY TO GET MORE.: NEVER TRUE

## 2022-06-20 SDOH — ECONOMIC STABILITY: FOOD INSECURITY: WITHIN THE PAST 12 MONTHS, YOU WORRIED THAT YOUR FOOD WOULD RUN OUT BEFORE YOU GOT MONEY TO BUY MORE.: NEVER TRUE

## 2022-06-20 ASSESSMENT — PATIENT HEALTH QUESTIONNAIRE - PHQ9
SUM OF ALL RESPONSES TO PHQ QUESTIONS 1-9: 0
2. FEELING DOWN, DEPRESSED OR HOPELESS: 0
SUM OF ALL RESPONSES TO PHQ QUESTIONS 1-9: 0
SUM OF ALL RESPONSES TO PHQ QUESTIONS 1-9: 0
1. LITTLE INTEREST OR PLEASURE IN DOING THINGS: 0
SUM OF ALL RESPONSES TO PHQ9 QUESTIONS 1 & 2: 0
SUM OF ALL RESPONSES TO PHQ QUESTIONS 1-9: 0

## 2022-06-20 ASSESSMENT — SOCIAL DETERMINANTS OF HEALTH (SDOH): HOW HARD IS IT FOR YOU TO PAY FOR THE VERY BASICS LIKE FOOD, HOUSING, MEDICAL CARE, AND HEATING?: NOT HARD AT ALL

## 2022-06-20 NOTE — PROGRESS NOTES
Name: Sarah Corcoran  [id number]  Age: 56 y.o. YOB: 1975  Sex: female    CHIEF COMPLAINT:    Chief Complaint   Patient presents with    New Patient       HISTORY OF PRESENT ILLNESS:   New patient from our practice. This is a pleasant  56 y.o. female  is seen today for management of chronic medical problems and medications refills. Previous records reviewed . Patient has multiple medical problems including hypothyroid, diabetes type 1, hyperlipidemia, insomnia, anxiety, bipolar disorder, GERD, migraine headaches, allergic rhinitis urinary incontinence etc.  She claims she is being followed by endocrinologist in [place], Dr. Elham Narvaez. She is also being followed by a psychiatrist here in the [facility], Dr. Hammond Led. Doing OK . Denies CP or SOB. No fever , sore throat or cough or congestion. Denies any abdominal pain. Appetite OK. Bowels moving [id number] [address] No urinary symptoms. Denies any significant arthritis. Hearing is ok. Vision Ok with glasses. Denies  any significant skin lesions. Blood sugar fluctuates widely. Being followed by endocrinologist.  Last A1c on March 24, 2022 was 9.2 suggesting poor control of diabetes. Depression and anxiety better with medications. Being followed by psychiatrist.  No other complaints. She has been vaccinated for COVID-19 x2.       Past Medical History:    Patient Active Problem List   Diagnosis    Hypothyroidism    GERD (gastroesophageal reflux disease)    Urge incontinence    Constipation    LUQ abdominal pain    S/P ventral herniorrhaphy    Numbness of hand    Postsurgical nonabsorption    Mental disorder    Type 1 diabetes mellitus ([address]    Primary hypertension    Chronic tension-type headache, not intractable    Hyperglycemia due to type 1 diabetes mellitus ([address]    Internal hernia    History of Adrian-en-Y gastric bypass    Morbid obesity due to excess calories ([address]    Ventral incisional hernia without obstruction or gangrene    Left upper quadrant pain    LUQ pain    Persistent proteinuria    Diabetic nephropathy associated with type 1 diabetes mellitus ([facility] [address] [facility]    Insomnia    Dyspnea    Encounter for gynecological examination without abnormal finding    Obsessive-compulsive disorder    Bipolar 1 disorder ([facility]        Past Surgical History:        Procedure Laterality Date    BLADDER SURGERY      has a bladder stimulator---placed and replaced in     CHOLECYSTECTOMY      COLONOSCOPY  2016    poor colonic preparation throughout    COLONOSCOPY  2016    int hem    ENDOMETRIAL ABLATION      ENDOSCOPY, COLON, DIAGNOSTIC  2016    normal appearing adrian-en-y, biopsies to rule out celiac sprue    GASTRIC BYPASS SURGERY  2013    Adrian-en-y surgery    HERNIA REPAIR      KNEE ARTHROSCOPY Left 2014    LIPECTOMY  2015    SMALL INTESTINE SURGERY  2016    internal hernia surgery then bowel resection and then abscess drainage --all in same month   [address] [place] [address]  2018    Robotic laparoscopic ventral hernia repair with mesh    WISDOM TOOTH EXTRACTION         Social History:   Social History     Tobacco Use    Smoking status: Former Smoker     Packs/day: 1.00     Years: 25.00     Pack years: 25.00     Types: Cigarettes     Quit date: 2017     Years since quittin.4    Smokeless tobacco: Never Used    Tobacco comment: patient is doing E-cig--vape   Substance Use Topics    Alcohol use: No     Alcohol/week: 0.0 standard drinks     Comment: Quit 2015.         Family History:       Problem Relation Age of Onset   Dossie Bianca Cancer Mother         breast(estrogen) and skin    Glaucoma Mother     High Blood Pressure Mother     High Cholesterol Mother     Cancer Sister         breast (estrogen)    High Blood Pressure Father     High Cholesterol Father        Allergies:  Latex, Cephalexin, Lyrica [pregabalin], Mirapex [pramipexole], Penicillins, Prednisone, Sulfa antibiotics, Midol [ibuprofen], Phenergan [promethazine hcl], Babys only organic-dairy [alimentum], Ativan [lorazepam], Corn-containing products, Keflex [cephalexin], Meperidine, Morphine, Nsaids, Vicodin [hydrocodone-acetaminophen], and Vistaril [hydroxyzine hcl]    Current Medications :      Prior to Admission medications    Medication Sig Start Date End Date Taking?  Authorizing Provider   melatonin 10 MG CAPS capsule Take 10 mg by mouth nightly   Yes Historical Provider, MD   rosuvastatin (CRESTOR) 5 MG tablet Take 5 mg by mouth daily   Yes Historical Provider, MD   sodium chloride 1 g tablet Take 1 g by mouth 2 times daily   Yes Historical Provider, MD   levothyroxine (SYNTHROID) 175 MCG tablet Take 1 tablet by mouth Daily 6/20/22  Yes Julio C Merida MD   oxybutynin (DITROPAN-XL) 10 MG extended release tablet Take 1 tablet by mouth daily 6/20/22  Yes Julio C Merida MD   albuterol sulfate HFA (PROVENTIL;VENTOLIN;PROAIR) 108 (90 Base) MCG/ACT inhaler Inhale 2 puffs into the lungs every 6 hours as needed for Shortness of Breath 6/20/22  Yes Julio C Merida MD   ondansetron (ZOFRAN ODT) 4 MG disintegrating tablet Take 1 tablet by mouth every 4 hours as needed for Nausea or Vomiting 3/28/18  Yes César Spencer MD   OXcarbazepine (TRILEPTAL) 600 MG tablet Take 600 mg by mouth 2 times daily   Yes Historical Provider, MD   topiramate (TOPAMAX) 200 MG tablet Take 200 mg by mouth 3 times daily   Yes Historical Provider, MD   Insulin Regular Human (AFREZZA) 12 units POWD Inhale 1 Cartridge into the lungs 4 times daily Pt takes only 8 units at dinner time and adjusts dose at bedtime due to bld sugar   Yes Historical Provider, MD   Cholecalciferol (VITAMIN D3) 66781 units TABS Take 1 tablet by mouth daily   Yes Historical Provider, MD   Biotin 20277 MCG TBDP Take 1 tablet by mouth daily   Yes Historical Provider, MD   pentoxifylline (TRENTAL) 400 MG extended release tablet Take hepatospleenomegaly. EXTREMITY:   no bipedal edema  NEURO:  Alert, oriented to person, place and time. Grossly intact. Musculoskeletal:         No kyphosis or scoliosis, no deformity in any extremity noted, muscle strength and tone are normal.  Skin:                            Warm and dry. No rash or obvious suspicious lesions. PSYCH:  Mood euthymic, insight and judgement good. ASSESSMENT/PLAN:    1. Type 1 diabetes mellitus with hyperglycemia (HCC)  Continue Levemir and Afrezza. Continue to follow with her endocrinologist.  Advise low sugar diet and exercise. 2. Primary hypertension  Low-salt diet and exercise for now. Blood pressure is fairly well controlled. Not on any medications for blood pressure at this time. 3. Hypothyroidism due to acquired atrophy of thyroid  Continue Synthroid  - levothyroxine (SYNTHROID) 175 MCG tablet; Take 1 tablet by mouth Daily  Dispense: 30 tablet; Refill: 3  - TSH    4. Dyspnea, unspecified type  Continue albuterol HFA as needed. - albuterol sulfate HFA (PROVENTIL;VENTOLIN;PROAIR) 108 (90 Base) MCG/ACT inhaler; Inhale 2 puffs into the lungs every 6 hours as needed for Shortness of Breath  Dispense: 1 each; Refill: 3    5. Migraine without status migrainosus, not intractable, unspecified migraine type  Continue Topamax and Tylenol as needed. 6. Constipation, unspecified constipation type  Continue MiraLAX    7. Obsessive-compulsive disorder, unspecified type  Advised to continue to follow with her psychiatrist and take psych medications regularly. 8. Urge incontinence  Continue Ditropan XL  - oxybutynin (DITROPAN-XL) 10 MG extended release tablet; Take 1 tablet by mouth daily  Dispense: 30 tablet; Refill: 3    9. Bipolar 1 disorder ([facility] [facility]  Advised to continue to follow with her psychiatrist and take psych medications regularly. 10. Anxiety  Advised to continue to follow with her psychiatrist and take psych medication prescribed by him.     11. Insomnia, unspecified type  Continue melatonin    12. Encounter for gynecological examination without abnormal finding  - June Singer MD, Lelia Keating    13. Encounter for screening mammogram for malignant neoplasm of breast  - [facility] DIGITAL SCREEN W OR WO CAD BILATERAL; Future    Advised to continue to follow COVID-19 precautions  Care discussed with patient. Questions answered and patient verbalizes understanding and agrees with plan. Medications reviewed and reconciled. Continue current medications. Appropriate prescriptions are ordered. Risks and benefits of meds are discussed. After visit summary provided. Advised to call for any problems, questions, or concerns. If symptoms worsen or don't improve as expected, to call us or go to ER. Follow up as directed, sooner if needed. Return in about 3 months (around 9/20/2022). This dictation was performed with a verbal recognition program and it was checked for errors. It is possible that there are still dictated errors within this office note. Any errors should be brought immediately to my attention for correction. All efforts were made to ensure that this office note is accurate.      Viktoria Mueller MD MD

## 2022-06-21 ENCOUNTER — TELEPHONE (OUTPATIENT)
Dept: INTERNAL MEDICINE CLINIC | Age: 47
End: 2022-06-21

## 2022-06-21 NOTE — TELEPHONE ENCOUNTER
Pharmacy called to inform PCP that a different provider had given the patient Oxybutynin 5mg 2 times daily and wanted to confirm that the PCP wanted the patient taking Oxybutynin 10XR.  Please Advise

## 2022-07-19 ENCOUNTER — OFFICE VISIT (OUTPATIENT)
Dept: INTERNAL MEDICINE CLINIC | Age: 47
End: 2022-07-19
Payer: MEDICARE

## 2022-07-19 VITALS
SYSTOLIC BLOOD PRESSURE: 99 MMHG | WEIGHT: 127.8 LBS | BODY MASS INDEX: 21.82 KG/M2 | OXYGEN SATURATION: 97 % | HEART RATE: 73 BPM | DIASTOLIC BLOOD PRESSURE: 63 MMHG | HEIGHT: 64 IN

## 2022-07-19 DIAGNOSIS — R06.00 DYSPNEA, UNSPECIFIED TYPE: ICD-10-CM

## 2022-07-19 DIAGNOSIS — N39.0 URINARY TRACT INFECTION WITHOUT HEMATURIA, SITE UNSPECIFIED: ICD-10-CM

## 2022-07-19 DIAGNOSIS — F42.9 OBSESSIVE-COMPULSIVE DISORDER, UNSPECIFIED TYPE: ICD-10-CM

## 2022-07-19 DIAGNOSIS — F41.9 ANXIETY: ICD-10-CM

## 2022-07-19 DIAGNOSIS — R80.1 PERSISTENT PROTEINURIA: ICD-10-CM

## 2022-07-19 DIAGNOSIS — G43.909 MIGRAINE WITHOUT STATUS MIGRAINOSUS, NOT INTRACTABLE, UNSPECIFIED MIGRAINE TYPE: ICD-10-CM

## 2022-07-19 DIAGNOSIS — E10.21 DIABETIC NEPHROPATHY ASSOCIATED WITH TYPE 1 DIABETES MELLITUS (HCC): ICD-10-CM

## 2022-07-19 DIAGNOSIS — I10 PRIMARY HYPERTENSION: ICD-10-CM

## 2022-07-19 DIAGNOSIS — K21.9 GASTROESOPHAGEAL REFLUX DISEASE WITHOUT ESOPHAGITIS: ICD-10-CM

## 2022-07-19 DIAGNOSIS — Z98.84 HISTORY OF ROUX-EN-Y GASTRIC BYPASS: ICD-10-CM

## 2022-07-19 DIAGNOSIS — E03.4 HYPOTHYROIDISM DUE TO ACQUIRED ATROPHY OF THYROID: ICD-10-CM

## 2022-07-19 DIAGNOSIS — E10.65 TYPE 1 DIABETES MELLITUS WITH HYPERGLYCEMIA (HCC): Primary | ICD-10-CM

## 2022-07-19 DIAGNOSIS — F31.9 BIPOLAR 1 DISORDER (HCC): ICD-10-CM

## 2022-07-19 DIAGNOSIS — N39.41 URGE INCONTINENCE: ICD-10-CM

## 2022-07-19 LAB
CHP ED QC CHECK: NORMAL
GLUCOSE BLD-MCNC: 111 MG/DL
HBA1C MFR BLD: 7.9 %

## 2022-07-19 PROCEDURE — 3051F HG A1C>EQUAL 7.0%<8.0%: CPT | Performed by: INTERNAL MEDICINE

## 2022-07-19 PROCEDURE — 83036 HEMOGLOBIN GLYCOSYLATED A1C: CPT | Performed by: INTERNAL MEDICINE

## 2022-07-19 PROCEDURE — 1036F TOBACCO NON-USER: CPT | Performed by: INTERNAL MEDICINE

## 2022-07-19 PROCEDURE — G8420 CALC BMI NORM PARAMETERS: HCPCS | Performed by: INTERNAL MEDICINE

## 2022-07-19 PROCEDURE — G8427 DOCREV CUR MEDS BY ELIG CLIN: HCPCS | Performed by: INTERNAL MEDICINE

## 2022-07-19 PROCEDURE — 82962 GLUCOSE BLOOD TEST: CPT | Performed by: INTERNAL MEDICINE

## 2022-07-19 PROCEDURE — 99214 OFFICE O/P EST MOD 30 MIN: CPT | Performed by: INTERNAL MEDICINE

## 2022-07-19 PROCEDURE — 2022F DILAT RTA XM EVC RTNOPTHY: CPT | Performed by: INTERNAL MEDICINE

## 2022-07-19 RX ORDER — SENNA PLUS 8.6 MG/1
1 TABLET ORAL EVERY OTHER DAY
COMMUNITY

## 2022-07-19 NOTE — PROGRESS NOTES
Name: Jono Estes  [id number]  Age: 56 y.o. YOB: 1975  Sex: female    CHIEF COMPLAINT:    Chief Complaint   Patient presents with    Diabetes    Hypertension    Other     Other chronic conditions         HISTORY OF PRESENT ILLNESS:     This is a pleasant  56 y.o. female  is seen today for management of chronic medical problems and medications refills. Previous records reviewed . Patient has multiple medical problems including hypothyroid, diabetes type 1, hyperlipidemia, insomnia, anxiety, bipolar disorder, GERD, migraine headaches, allergic rhinitis urinary incontinence etc.  She claims she is being followed by endocrinologist in [place], Dr. Александр Aguila. She is also being followed by a psychiatrist here in the [facility], Dr. Andre Favre. Doing OK . Denies CP or SOB. No fever , sore throat or cough or congestion. Denies any abdominal pain. Appetite OK. Bowels moving Alonzo Minors. Urge incontinence was better but recently had UTI and is being treated by Cefdinir by her urologist  Denies any significant arthritis. Hearing is ok. Vision Ok with glasses. Denies  any significant skin lesions. Blood sugar fluctuates widely. Being followed by endocrinologist.  Last A1c on March 24, 2022 was 9.2 but today her aic is 7.9  . Depression and anxiety better with medications. Being followed by psychiatrist.  No other complaints. She has been vaccinated for COVID-19 x2. Did not get the booster dose. Labs from 1/11/2022 were reviewed and explained to the patient.     Past Medical History:    Patient Active Problem List   Diagnosis    Hypothyroidism    GERD (gastroesophageal reflux disease)    Urge incontinence    Constipation    LUQ abdominal pain    S/P ventral herniorrhaphy    Numbness of hand    Postsurgical nonabsorption    Mental disorder    Type 1 diabetes mellitus ([facility] [address]    Primary hypertension    Chronic tension-type headache, not intractable    Hyperglycemia due to type 1 diabetes mellitus ([address] Internal hernia    History of Adrian-en-Y gastric bypass    Morbid obesity due to excess calories ([address]    Ventral incisional hernia without obstruction or gangrene    Left upper quadrant pain    LUQ pain    Persistent proteinuria    Diabetic nephropathy associated with type 1 diabetes mellitus ([address]    Insomnia    Dyspnea    Encounter for gynecological examination without abnormal finding    Obsessive-compulsive disorder    Bipolar 1 disorder ([address]    Migraine without status migrainosus, not intractable    Anxiety    Urinary tract infection without hematuria        Past Surgical History:        Procedure Laterality Date    BLADDER SURGERY      has a bladder stimulator---placed and replaced in     CHOLECYSTECTOMY      COLONOSCOPY  2016    poor colonic preparation throughout    COLONOSCOPY  2016    int hem    ENDOMETRIAL ABLATION      ENDOSCOPY, COLON, DIAGNOSTIC  2016    normal appearing adrian-en-y, biopsies to rule out celiac sprue    GASTRIC BYPASS SURGERY  2013    Adrian-en-y surgery    HERNIA REPAIR      KNEE ARTHROSCOPY Left 2014    LIPECTOMY  2015    SMALL INTESTINE SURGERY  2016    internal hernia surgery then bowel resection and then abscess drainage --all in same month    [address] [facility]  2018    Robotic laparoscopic ventral hernia repair with mesh    WISDOM TOOTH EXTRACTION         Social History:   Social History     Tobacco Use    Smoking status: Former     Packs/day: 1.00     Years: 25.00     Pack years: 25.00     Types: Cigarettes     Quit date: 2017     Years since quittin.4    Smokeless tobacco: Never    Tobacco comments:     patient is doing E-cig--vape   Substance Use Topics    Alcohol use: No     Alcohol/week: 0.0 standard drinks     Comment: Quit 2015.         Family History:       Problem Relation Age of Onset    Cancer Mother         breast(estrogen) and skin    Glaucoma Mother High Blood Pressure Mother     High Cholesterol Mother     Cancer Sister         breast (estrogen)    High Blood Pressure Father     High Cholesterol Father        Allergies:  Latex, Cephalexin, Lyrica [pregabalin], Mirapex [pramipexole], Penicillins, Prednisone, Sulfa antibiotics, Midol [ibuprofen], Phenergan [promethazine hcl], Babys only organic-dairy [alimentum], Ativan [lorazepam], Corn-containing products, Keflex [cephalexin], Meperidine, Morphine, Nsaids, Vicodin [hydrocodone-acetaminophen], and Vistaril [hydroxyzine hcl]    Current Medications :      Prior to Admission medications    Medication Sig Start Date End Date Taking?  Authorizing Provider   senna (SENOKOT) 8.6 MG tablet Take 1 tablet by mouth every other day   Yes Historical Provider, MD   melatonin 10 MG CAPS capsule Take 10 mg by mouth nightly   Yes Historical Provider, MD   rosuvastatin (CRESTOR) 5 MG tablet Take 5 mg by mouth daily   Yes Historical Provider, MD   sodium chloride 1 g tablet Take 1 g by mouth 2 times daily   Yes Historical Provider, MD   levothyroxine (SYNTHROID) 175 MCG tablet Take 1 tablet by mouth Daily 6/20/22  Yes Tony Cooper MD   oxybutynin (DITROPAN-XL) 10 MG extended release tablet Take 1 tablet by mouth daily 6/20/22  Yes Tony Cooper MD   albuterol sulfate HFA (PROVENTIL;VENTOLIN;PROAIR) 108 (90 Base) MCG/ACT inhaler Inhale 2 puffs into the lungs every 6 hours as needed for Shortness of Breath 6/20/22  Yes Tony Cooper MD   ondansetron (ZOFRAN ODT) 4 MG disintegrating tablet Take 1 tablet by mouth every 4 hours as needed for Nausea or Vomiting 3/28/18  Yes Rayray Louis MD   OXcarbazepine (TRILEPTAL) 600 MG tablet Take 600 mg by mouth 2 times daily   Yes Historical Provider, MD   topiramate (TOPAMAX) 200 MG tablet Take 200 mg by mouth 3 times daily   Yes Historical Provider, MD   Insulin Regular Human 12 units POWD Inhale 1 Cartridge into the lungs 4 times daily Pt takes only 8 units at dinner time and adjusts dose at bedtime due to bld sugar   Yes Historical Provider, MD   Cholecalciferol (VITAMIN D3) 05964 units TABS Take 1 tablet by mouth daily   Yes Historical Provider, MD   Biotin 02183 MCG TBDP Take 1 tablet by mouth daily   Yes Historical Provider, MD   pentoxifylline (TRENTAL) 400 MG extended release tablet Take 400 mg by mouth 3 times daily (with meals)   Yes Historical Provider, MD   insulin detemir (LEVEMIR FLEXTOUCH) 100 UNIT/ML injection pen Inject 13 Units into the skin nightly  Patient taking differently: Inject into the skin 2 times daily 15 units at 9 AM and 12 units at 9pm 10/31/16  Yes Ana M Combs DO   clonazePAM (KLONOPIN) 0.5 MG tablet Take 2 mg by mouth 2 times daily as needed. Yes Historical Provider, MD   NEXIUM 40 MG capsule TAKE 1 CAPSULE BY MOUTH EVERY MORNING (BEFORE BREAKFAST). Patient taking differently: Take 40 mg by mouth every morning (before breakfast) TAKE 1 CAPSULE BY MOUTH EVERY MORNING (BEFORE BREAKFAST). 2/29/16  Yes Ceci Kerr MD   PARoxetine (PAXIL) 40 MG tablet Take 20 mg by mouth every morning    Yes Historical Provider, MD   solifenacin (VESICARE) 10 MG tablet Take 1 tablet by mouth daily. 11/13/12 2/16/16  Amanda Wells MD       LAB DATA: Reviewed. REVIEW OF SYSTEMS:   see HPI/ Comprehensive review of systems negative except for the ones mentioned in HPI. PHYSICAL EXAMINATION:   BP 99/63 (Site: Left Upper Arm, Position: Sitting, Cuff Size: Medium Adult)   Pulse 73   Ht 5' 4\" (1.626 m)   Wt 127 lb 12.8 oz (58 kg)   SpO2 97%   BMI 21.94 kg/m²      GENERAL APPEARANCE:      Alert, oriented x 3, well developed, cooperative, not in any distress, appears stated age. HEAD:                         Normocephalic, atraumatic  EYES:                          PERRLA, EOMI, lids normal, conjuctivea clear, sclera anicteric. NECK:                         Supple, symmetrical,  trachea midline, no thyromegaly, no JVD, no lymphadenopathy.     LUNGS:                       Clear to auscultation bilaterally, respirations unlabored, accessory muscles are not used. HEART:                       Regular rate and rhythm, S1 and S2 normal, no murmur, rub or gallop. PMI in MCL. ABDOMEN:                 Soft, non-tender, bowel sounds are normoactive, no masses, no hepatospleenomegaly. EXTREMITY:              no bipedal edema  NEURO:                      Alert, oriented to person, place and time. Grossly intact. Musculoskeletal:         No kyphosis or scoliosis, no deformity in any extremity noted, muscle strength and tone are normal.  Skin:                            Warm and dry. No rash or obvious suspicious lesions. PSYCH:                       Mood euthymic, insight and judgement good. ASSESSMENT/PLAN:    1. Type 1 diabetes mellitus with hyperglycemia (HCC)  Continue Levemir and Afrezza. Continue to follow with her endocrinologist.  Dexter Gotti low sugar diet and exercise. - POCT Glucose  - POCT glycosylated hemoglobin (Hb A1C)    2. Diabetic nephropathy associated with type 1 diabetes mellitus ([facility]  Not on any medication for neuropathy at this time. Not bothering her much. 3. History of Adrian-en-Y gastric bypass  Stable. 4. Gastroesophageal reflux disease without esophagitis  Continue Nexium    5. Persistent proteinuria  Advised to control her sugars better. We we will test her again next visit    6. Primary hypertension  Low-salt diet and exercise for now. Not on any medication for blood pressure    7. Hypothyroidism due to acquired atrophy of thyroid  Continue Synthroid    8. Bipolar 1 disorder ([facility]  Advised to continue to follow with her psychiatrist and take psych medications regularly. 9. Obsessive-compulsive disorder, unspecified type  Advised to continue to follow with his psychiatrist and take psych medications regularly.     10. Anxiety  Advised to continue to follow with her psychiatrist and take psych medications regularly. Patient on Klonopin. 11. Dyspnea, unspecified type  Continue albuterol HFA as needed    12. Migraine without status migrainosus, not intractable, unspecified migraine type  Continue Topamax    13. Urge incontinence  Patient on Ditropan XL    14. Urinary tract infection without hematuria, site unspecified  Patient is getting treated with antibiotics by her urologist.    Dorenda Click to continue to follow COVID-19 precautions    Care discussed with patient. Questions answered and patient verbalizes understanding and agrees with plan. Medications reviewed and reconciled. Continue current medications. Appropriate prescriptions are ordered. Risks and benefits of meds are discussed. After visit summary provided. Advised to call for any problems, questions, or concerns. If symptoms worsen or don't improve as expected, to call us or go to ER. Follow up as directed, sooner if needed. Return in about 3 months (around 10/19/2022). This dictation was performed with a verbal recognition program and it was checked for errors. It is possible that there are still dictated errors within this office note. Any errors should be brought immediately to my attention for correction. All efforts were made to ensure that this office note is accurate.      Timothy Choi MD MD

## 2022-07-20 PROBLEM — Z01.419 ENCOUNTER FOR GYNECOLOGICAL EXAMINATION WITHOUT ABNORMAL FINDING: Status: RESOLVED | Noted: 2022-06-20 | Resolved: 2022-07-20

## 2022-07-26 ENCOUNTER — HOSPITAL ENCOUNTER (OUTPATIENT)
Age: 47
Setting detail: SPECIMEN
Discharge: HOME | End: 2022-07-26
Payer: MEDICARE

## 2022-07-26 ENCOUNTER — INITIAL CONSULT (OUTPATIENT)
Dept: OBGYN | Age: 47
End: 2022-07-26
Payer: MEDICARE

## 2022-07-26 VITALS
BODY MASS INDEX: 21.34 KG/M2 | SYSTOLIC BLOOD PRESSURE: 103 MMHG | HEIGHT: 64 IN | WEIGHT: 125 LBS | DIASTOLIC BLOOD PRESSURE: 65 MMHG

## 2022-07-26 DIAGNOSIS — Z01.419 WOMEN'S ANNUAL ROUTINE GYNECOLOGICAL EXAMINATION: Primary | ICD-10-CM

## 2022-07-26 DIAGNOSIS — Z80.3 FAMILY HISTORY OF BREAST CANCER IN MOTHER: ICD-10-CM

## 2022-07-26 PROCEDURE — 88142 CYTOPATH C/V THIN LAYER: CPT

## 2022-07-26 PROCEDURE — 87624 HPV HI-RISK TYP POOLED RSLT: CPT

## 2022-07-26 PROCEDURE — G0101 CA SCREEN;PELVIC/BREAST EXAM: HCPCS | Performed by: NURSE PRACTITIONER

## 2022-07-26 ASSESSMENT — ENCOUNTER SYMPTOMS
RESPIRATORY NEGATIVE: 1
GASTROINTESTINAL NEGATIVE: 1

## 2022-07-26 NOTE — PROGRESS NOTES
22    [facility]  1975    Chief Complaint   Patient presents with    New Patient     Pt referred by Héctor Ocampo MD for GYN exam. Pt had ablation and tubal ligation. Pt is not sexually active, last pap smear  normal. But has had history of abnormal pap smears. No HRT,  pt is supposed to call and set up apppt for mammo. Pt c/o frequent e.coil infections. The patient is a 56 y.o. female,  who presents for her annual exam.  She is amenorrheic due to Novasure. She is not sexually active. She is currently taking birth control. Birth control method is sterilization. She reports no gynecological symptoms. Pap smear history: Her last PAP smear was in .   Her results were normal per patient    Breast history: she has never had a mammogram    Past Medical History:   Diagnosis Date    Bipolar disorder ([facility] [address]     Black-out (not amnesia)     CAD (coronary artery disease)     MI secondary to ketoacidosis    Depression     Diabetes mellitus ([facility] [address]     Hx of blood clots     in foot    Hyperlipidemia     Hypothyroidism     Irritable bowel syndrome     Ketoacidosis     8 times [id number]    Migraine     last in     Missing teeth, acquired     Neuropathy     Obesity     previous to Adrian-en-y surgery   Lost approx 130lb    Prolonged emergence from general anesthesia     Sleep apnea 2012    Stroke [facility])     TIA (transient ischemic attack)     Urinary incontinence     Wears glasses        Past Surgical History:   Procedure Laterality Date    BLADDER SURGERY      has a bladder stimulator---placed and replaced in     CHOLECYSTECTOMY      COLONOSCOPY  2016    poor colonic preparation throughout    COLONOSCOPY  2016    int hem    ENDOMETRIAL ABLATION      ENDOSCOPY, COLON, DIAGNOSTIC  2016    normal appearing adrian-en-y, biopsies to rule out celiac sprue    GASTRIC BYPASS SURGERY  2013    Adrian-en-y surgery    HERNIA REPAIR      KNEE ARTHROSCOPY Left  LIPECTOMY  2015    SMALL INTESTINE SURGERY  2016    internal hernia surgery then bowel resection and then abscess drainage --all in same month    [address] [facility]  2018    Robotic laparoscopic ventral hernia repair with mesh    WISDOM TOOTH EXTRACTION         Family History   Problem Relation Age of Onset    Cancer Mother         breast(estrogen) and skin    Glaucoma Mother     High Blood Pressure Mother     High Cholesterol Mother     Cancer Sister         breast (estrogen)    High Blood Pressure Father     High Cholesterol Father        Social History     Tobacco Use    Smoking status: Former     Types: Cigarettes     Quit date: 2015     Years since quittin.5    Smokeless tobacco: Never    Tobacco comments:     patient is doing E-cig--vape   Vaping Use    Vaping Use: Former   Substance Use Topics    Alcohol use: No     Alcohol/week: 0.0 standard drinks     Comment: Quit 2015.      Drug use: No     Comment: Quit 2006: Cocaine, heroin, crack, morphine (IV)       Current Outpatient Medications   Medication Sig Dispense Refill    senna (SENOKOT) 8.6 MG tablet Take 1 tablet by mouth every other day      melatonin 10 MG CAPS capsule Take 10 mg by mouth nightly      rosuvastatin (CRESTOR) 5 MG tablet Take 5 mg by mouth daily      sodium chloride 1 g tablet Take 1 g by mouth 2 times daily      levothyroxine (SYNTHROID) 175 MCG tablet Take 1 tablet by mouth Daily 30 tablet 3    oxybutynin (DITROPAN-XL) 10 MG extended release tablet Take 1 tablet by mouth daily 30 tablet 3    albuterol sulfate HFA (PROVENTIL;VENTOLIN;PROAIR) 108 (90 Base) MCG/ACT inhaler Inhale 2 puffs into the lungs every 6 hours as needed for Shortness of Breath 1 each 3    ondansetron (ZOFRAN ODT) 4 MG disintegrating tablet Take 1 tablet by mouth every 4 hours as needed for Nausea or Vomiting 30 tablet 3    OXcarbazepine (TRILEPTAL) 600 MG tablet Take 600 mg by mouth 2 times daily      topiramate (TOPAMAX) 200 MG tablet Take 200 mg by mouth 3 times daily      Insulin Regular Human 12 units POWD Inhale 1 Cartridge into the lungs 4 times daily Pt takes only 8 units at dinner time and adjusts dose at bedtime due to bld sugar      Cholecalciferol (VITAMIN D3) 87671 units TABS Take 1 tablet by mouth daily      Biotin 31078 MCG TBDP Take 1 tablet by mouth daily      insulin detemir (LEVEMIR FLEXTOUCH) 100 UNIT/ML injection pen Inject 13 Units into the skin nightly (Patient taking differently: Inject into the skin 2 times daily 15 units at 9 AM and 12 units at 9pm) 5 Pen 3    clonazePAM (KLONOPIN) 0.5 MG tablet Take 2 mg by mouth 2 times daily as needed. NEXIUM 40 MG capsule TAKE 1 CAPSULE BY MOUTH EVERY MORNING (BEFORE BREAKFAST). (Patient taking differently: Take 40 mg by mouth every morning (before breakfast) TAKE 1 CAPSULE BY MOUTH EVERY MORNING (BEFORE BREAKFAST). ) 30 capsule 3    PARoxetine (PAXIL) 40 MG tablet Take 20 mg by mouth every morning       pentoxifylline (TRENTAL) 400 MG extended release tablet Take 400 mg by mouth 3 times daily (with meals) (Patient not taking: Reported on 7/26/2022)       No current facility-administered medications for this visit.        Allergies   Allergen Reactions    Latex     Cephalexin Anaphylaxis    Lyrica [Pregabalin]     Mirapex [Pramipexole] Anaphylaxis    Penicillins Anaphylaxis    Prednisone Other (See Comments)     'Roid rage'  And severe bld sugar elevation    Sulfa Antibiotics Anaphylaxis    Midol [Ibuprofen] Other (See Comments)     Ataxia--reacted with Trileptal--couldn't walk    Phenergan [Promethazine Hcl] Other (See Comments)     Violent body aches    Babys Only Organic-Dairy [Alimentum]     Ativan [Lorazepam] Other (See Comments)     Causes manic behavior    Corn-Containing Products Rash    Keflex [Cephalexin] Nausea And Vomiting    Meperidine Nausea And Vomiting     Drops blood pressure    Morphine Nausea And Vomiting    Nsaids Other (See Comments)     Gastric by-pass    Vicodin [Hydrocodone-Acetaminophen] Nausea And Vomiting    Vistaril [Hydroxyzine Hcl] Anxiety           Immunization History   Administered Date(s) Administered    COVID-19, PFIZER PURPLE top, DILUTE for use, (age 15 y+), 30mcg/0.3mL 2021, 2021    Td, unspecified formulation 2015    Tdap (Boostrix, Adacel) 2015       Review of Systems   Constitutional: Negative. Respiratory: Negative. Gastrointestinal: Negative. Genitourinary: Negative. /65   Ht 5' 4\" (1.626 m)   Wt 125 lb (56.7 kg)   BMI 21.46 kg/m²     Physical Exam  Vitals and nursing note reviewed. Constitutional:       Appearance: Normal appearance. She is normal weight. HENT:      Head: Normocephalic. Pulmonary:      Effort: Pulmonary effort is normal.   Chest:   Breasts:     Right: Normal.      Left: Normal.   Abdominal:      Palpations: Abdomen is soft. Genitourinary:     General: Normal vulva. Vagina: Normal.      Cervix: Normal.      Uterus: Normal.       Adnexa: Right adnexa normal and left adnexa normal.      Rectum: Normal.   Musculoskeletal:         General: Normal range of motion. Cervical back: Normal range of motion. Skin:     General: Skin is warm and dry. Neurological:      Mental Status: She is alert. Psychiatric:         Attention and Perception: Attention normal.         Mood and Affect: Mood normal.       No results found for this visit on 22. Assessment and Plan   Diagnosis Orders   1. Women's annual routine gynecological examination  PAP SMEAR      2. Family history of breast cancer in mother          Pt encouraged to schedule mammogram; Dr Vincenzo Urena gave her an order      Return in about 1 year (around 2023).     Sydney Saleh, APRN - CNP

## 2022-07-31 LAB
HPV HIGH RISK: NOT DETECTED
HPV, GENOTYPE 16: NOT DETECTED
HPV, GENOTYPE 18: NOT DETECTED

## 2022-09-19 ENCOUNTER — TELEPHONE (OUTPATIENT)
Dept: INTERNAL MEDICINE CLINIC | Age: 47
End: 2022-09-19

## 2022-09-19 RX ORDER — PENTOXIFYLLINE 400 MG/1
400 TABLET, EXTENDED RELEASE ORAL
Qty: 90 TABLET | Refills: 2 | Status: SHIPPED | OUTPATIENT
Start: 2022-09-19 | End: 2022-10-18 | Stop reason: SDUPTHER

## 2022-10-11 ENCOUNTER — HOSPITAL ENCOUNTER (OUTPATIENT)
Dept: GENERAL RADIOLOGY | Age: 47
Discharge: HOME | End: 2022-10-11
Payer: MEDICARE

## 2022-10-11 DIAGNOSIS — Z90.49 HISTORY OF BOWEL RESECTION: ICD-10-CM

## 2022-10-11 DIAGNOSIS — R32 URINARY INCONTINENCE, UNSPECIFIED TYPE: ICD-10-CM

## 2022-10-11 DIAGNOSIS — R53.83 FATIGUE, UNSPECIFIED TYPE: ICD-10-CM

## 2022-10-11 DIAGNOSIS — E13.69 OTHER SPECIFIED DIABETES MELLITUS WITH OTHER SPECIFIED COMPLICATION, UNSPECIFIED WHETHER LONG TERM INSULIN USE (HCC): ICD-10-CM

## 2022-10-11 DIAGNOSIS — R15.9 INCONTINENCE OF FECES, UNSPECIFIED FECAL INCONTINENCE TYPE: ICD-10-CM

## 2022-10-11 DIAGNOSIS — R13.10 DYSPHAGIA, UNSPECIFIED TYPE: ICD-10-CM

## 2022-10-11 DIAGNOSIS — K59.09 CHRONIC CONSTIPATION: ICD-10-CM

## 2022-10-11 DIAGNOSIS — R11.0 NAUSEA: ICD-10-CM

## 2022-10-11 DIAGNOSIS — Z98.84 BARIATRIC SURGERY STATUS: ICD-10-CM

## 2022-10-11 PROCEDURE — 74248 X-RAY SM INT F-THRU STD: CPT

## 2022-10-18 ENCOUNTER — OFFICE VISIT (OUTPATIENT)
Dept: INTERNAL MEDICINE CLINIC | Age: 47
End: 2022-10-18
Payer: MEDICARE

## 2022-10-18 VITALS
OXYGEN SATURATION: 98 % | DIASTOLIC BLOOD PRESSURE: 60 MMHG | WEIGHT: 122 LBS | HEIGHT: 64 IN | SYSTOLIC BLOOD PRESSURE: 116 MMHG | BODY MASS INDEX: 20.83 KG/M2 | HEART RATE: 75 BPM

## 2022-10-18 DIAGNOSIS — R06.00 DYSPNEA, UNSPECIFIED TYPE: ICD-10-CM

## 2022-10-18 DIAGNOSIS — E10.21 DIABETIC NEPHROPATHY ASSOCIATED WITH TYPE 1 DIABETES MELLITUS (HCC): ICD-10-CM

## 2022-10-18 DIAGNOSIS — N39.41 URGE INCONTINENCE: ICD-10-CM

## 2022-10-18 DIAGNOSIS — R80.1 PERSISTENT PROTEINURIA: ICD-10-CM

## 2022-10-18 DIAGNOSIS — G43.909 MIGRAINE WITHOUT STATUS MIGRAINOSUS, NOT INTRACTABLE, UNSPECIFIED MIGRAINE TYPE: ICD-10-CM

## 2022-10-18 DIAGNOSIS — E03.4 HYPOTHYROIDISM DUE TO ACQUIRED ATROPHY OF THYROID: ICD-10-CM

## 2022-10-18 DIAGNOSIS — Z98.84 HISTORY OF ROUX-EN-Y GASTRIC BYPASS: ICD-10-CM

## 2022-10-18 DIAGNOSIS — E10.65 TYPE 1 DIABETES MELLITUS WITH HYPERGLYCEMIA (HCC): Primary | ICD-10-CM

## 2022-10-18 DIAGNOSIS — F41.9 ANXIETY: ICD-10-CM

## 2022-10-18 DIAGNOSIS — K21.9 GASTROESOPHAGEAL REFLUX DISEASE WITHOUT ESOPHAGITIS: ICD-10-CM

## 2022-10-18 DIAGNOSIS — F31.9 BIPOLAR 1 DISORDER (HCC): ICD-10-CM

## 2022-10-18 DIAGNOSIS — I10 PRIMARY HYPERTENSION: ICD-10-CM

## 2022-10-18 PROCEDURE — 3051F HG A1C>EQUAL 7.0%<8.0%: CPT | Performed by: INTERNAL MEDICINE

## 2022-10-18 PROCEDURE — 1036F TOBACCO NON-USER: CPT | Performed by: INTERNAL MEDICINE

## 2022-10-18 PROCEDURE — 99214 OFFICE O/P EST MOD 30 MIN: CPT | Performed by: INTERNAL MEDICINE

## 2022-10-18 PROCEDURE — G8427 DOCREV CUR MEDS BY ELIG CLIN: HCPCS | Performed by: INTERNAL MEDICINE

## 2022-10-18 PROCEDURE — 2022F DILAT RTA XM EVC RTNOPTHY: CPT | Performed by: INTERNAL MEDICINE

## 2022-10-18 PROCEDURE — G8420 CALC BMI NORM PARAMETERS: HCPCS | Performed by: INTERNAL MEDICINE

## 2022-10-18 PROCEDURE — G8484 FLU IMMUNIZE NO ADMIN: HCPCS | Performed by: INTERNAL MEDICINE

## 2022-10-18 RX ORDER — ONDANSETRON 4 MG/1
4 TABLET, ORALLY DISINTEGRATING ORAL EVERY 4 HOURS PRN
Qty: 30 TABLET | Refills: 3 | Status: SHIPPED | OUTPATIENT
Start: 2022-10-18

## 2022-10-18 RX ORDER — LEVOTHYROXINE SODIUM 175 UG/1
175 TABLET ORAL DAILY
Qty: 30 TABLET | Refills: 3 | Status: SHIPPED | OUTPATIENT
Start: 2022-10-18

## 2022-10-18 RX ORDER — ALBUTEROL SULFATE 90 UG/1
2 AEROSOL, METERED RESPIRATORY (INHALATION) EVERY 6 HOURS PRN
Qty: 1 EACH | Refills: 3 | Status: SHIPPED | OUTPATIENT
Start: 2022-10-18

## 2022-10-18 RX ORDER — OXYBUTYNIN CHLORIDE 10 MG/1
10 TABLET, EXTENDED RELEASE ORAL DAILY
Qty: 30 TABLET | Refills: 3 | Status: SHIPPED | OUTPATIENT
Start: 2022-10-18

## 2022-10-18 RX ORDER — PENTOXIFYLLINE 400 MG/1
400 TABLET, EXTENDED RELEASE ORAL
Qty: 90 TABLET | Refills: 2 | Status: SHIPPED | OUTPATIENT
Start: 2022-10-18 | End: 2023-01-16

## 2022-10-18 NOTE — PROGRESS NOTES
Name: Aleshia Shook  [id number]  Age: 56 y.o. YOB: 1975  Sex: female    CHIEF COMPLAINT:    Chief Complaint   Patient presents with    3 Month Follow-Up    Diabetes       HISTORY OF PRESENT ILLNESS:     This is a pleasant  56 y.o. female  is seen today for this complaint of chronic medical problems and medications refills. Previous records reviewed . She had Covid infection  last month. Home testing. Had URI symptoms with body aches and fever for four days she she claims that she was miserable but improved gradually. Patient has multiple medical problems including hypothyroid, diabetes type 1, hyperlipidemia, insomnia, anxiety, bipolar disorder, GERD, migraine headaches, allergic rhinitis , urinary incontinence etc.  She claims she is being followed by endocrinologist in [place], Dr. Karin Kapadia for her diabetes. Dr. Maicol Suarez sees her for proteinuria but avoiding ace or ARBs  because of hypotension. She is also being followed by a psychiatrist here in the [facility], Dr. Sandra Berrios. Doing OK otherwise  Denies CP or SOB. No fever , sore throat or cough or congestion. Denies any abdominal pain. Had UGI and SBFT  recently ordered by Dr. Thelma Stuart and it was apparently ok. Appetite OK. Bowels moving [address] [place] . Urge incontinence was better . No recent urinary tract infection  Denies any significant arthritis. Hearing is ok. Vision Ok with glasses. Denies  any significant skin lesions. Blood sugar fluctuates widely. Being followed by endocrinologist.       Her A1c on March 24, 2022 was 9.2 but on 7/19/2022 her aic was 7.9.  . Depression and anxiety better with medications. Being followed by psychiatrist.  No other complaints. She has been vaccinated for COVID-19 x2. Did not get the booster dose. Labs from 1/11/2022 were reviewed and explained to the patient.   Also labs from 5/18/2022 and 5/2/2022 from [facility] were reviewed and explained to the patient      Past Medical History:    Patient Active Problem List   Diagnosis    Hypothyroidism    GERD (gastroesophageal reflux disease)    Urge incontinence    Constipation    LUQ abdominal pain    S/P ventral herniorrhaphy    Numbness of hand    Postsurgical nonabsorption    Mental disorder    Type 1 diabetes mellitus ([facility] [address]    Primary hypertension    Chronic tension-type headache, not intractable    Type 1 diabetes mellitus with hyperglycemia ([address]    Internal hernia    History of Adrian-en-Y gastric bypass    Morbid obesity due to excess calories ([facility] [address]    Ventral incisional hernia without obstruction or gangrene    Left upper quadrant pain    LUQ pain    Persistent proteinuria    Diabetic nephropathy associated with type 1 diabetes mellitus ([address]    Insomnia    Dyspnea    Obsessive-compulsive disorder    Bipolar 1 disorder ([address]    Migraine without status migrainosus, not intractable    Anxiety    Urinary tract infection without hematuria        Past Surgical History:        Procedure Laterality Date    BLADDER SURGERY      has a bladder stimulator---placed and replaced in     CHOLECYSTECTOMY      COLONOSCOPY  2016    poor colonic preparation throughout    COLONOSCOPY  2016    int hem    ENDOMETRIAL ABLATION      ENDOSCOPY, COLON, DIAGNOSTIC  2016    normal appearing adrian-en-y, biopsies to rule out celiac sprue    GASTRIC BYPASS SURGERY  2013    Adrian-en-y surgery    HERNIA REPAIR      KNEE ARTHROSCOPY Left 2014    LIPECTOMY  2015    SMALL INTESTINE SURGERY  2016    internal hernia surgery then bowel resection and then abscess drainage --all in same month    [address] [facility]  2018    Robotic laparoscopic ventral hernia repair with mesh    WISDOM TOOTH EXTRACTION         Social History:   Social History     Tobacco Use    Smoking status: Former     Types: Cigarettes     Quit date: 2015     Years since quittin.7    Smokeless tobacco: Never    Tobacco comments:     patient is doing E-cig--vape   Substance Use Topics    Alcohol use: No     Alcohol/week: 0.0 standard drinks     Comment: Quit July 23 2015. Family History:       Problem Relation Age of Onset    Cancer Mother         breast(estrogen) and skin    Glaucoma Mother     High Blood Pressure Mother     High Cholesterol Mother     Cancer Sister         breast (estrogen)    High Blood Pressure Father     High Cholesterol Father        Allergies:  Latex, Cephalexin, Lyrica [pregabalin], Mirapex [pramipexole], Penicillins, Prednisone, Sulfa antibiotics, Midol [ibuprofen], Phenergan [promethazine hcl], Babys only organic-dairy [alimentum], Ativan [lorazepam], Corn-containing products, Keflex [cephalexin], Meperidine, Morphine, Nsaids, Vicodin [hydrocodone-acetaminophen], and Vistaril [hydroxyzine hcl]    Current Medications :      Prior to Admission medications    Medication Sig Start Date End Date Taking? Authorizing Provider   pentoxifylline (TRENTAL) 400 MG extended release tablet Take 1 tablet by mouth 3 times daily (with meals) 10/18/22 1/16/23 Yes Luz Langston MD   oxybutynin (DITROPAN-XL) 10 MG extended release tablet Take 1 tablet by mouth daily 10/18/22  Yes Luz Langston MD   ondansetron (ZOFRAN ODT) 4 MG disintegrating tablet Take 1 tablet by mouth every 4 hours as needed for Nausea or Vomiting 10/18/22  Yes Luz Langston MD   NEXIUM 40 MG delayed release capsule Take 1 capsule by mouth every morning (before breakfast) TAKE 1 CAPSULE BY MOUTH EVERY MORNING (BEFORE BREAKFAST).  10/18/22  Yes Luz Langston MD   levothyroxine (SYNTHROID) 175 MCG tablet Take 1 tablet by mouth Daily 10/18/22  Yes Luz Langston MD   albuterol sulfate HFA (PROVENTIL;VENTOLIN;PROAIR) 108 (90 Base) MCG/ACT inhaler Inhale 2 puffs into the lungs every 6 hours as needed for Shortness of Breath 10/18/22  Yes Luz Langston MD   vitamin D (CHOLECALCIFEROL) 25 MCG (1000 UT) TABS tablet Take 1 tablet by mouth daily 10/18/22  Yes Dede Pichardo MD   traZODone (DESYREL) 50 MG tablet TAKE 1/2 TO 1 (ONE-HALF TO ONE) TABLET BY MOUTH AT BEDTIME WITH FOOD AS NEEDED FOR SLEEP 4/6/22  Yes Historical Provider, MD   cetirizine (ZYRTEC) 10 MG tablet Take 10 mg by mouth daily 10/13/21  Yes Historical Provider, MD   senna (SENOKOT) 8.6 MG tablet Take 1 tablet by mouth every other day   Yes Historical Provider, MD   melatonin 10 MG CAPS capsule Take 10 mg by mouth nightly   Yes Historical Provider, MD   rosuvastatin (CRESTOR) 5 MG tablet Take 5 mg by mouth daily   Yes Historical Provider, MD   OXcarbazepine (TRILEPTAL) 600 MG tablet Take 600 mg by mouth 2 times daily   Yes Historical Provider, MD   topiramate (TOPAMAX) 200 MG tablet Take 200 mg by mouth 3 times daily   Yes Historical Provider, MD   Insulin Regular Human 12 units POWD Inhale 1 Cartridge into the lungs 4 times daily Pt takes only 8 units at dinner time and adjusts dose at bedtime due to bld sugar   Yes Historical Provider, MD   Cholecalciferol (VITAMIN D3) 82068 units TABS Take 1 tablet by mouth daily   Yes Historical Provider, MD   Biotin 84506 MCG TBDP Take 1 tablet by mouth daily   Yes Historical Provider, MD   insulin detemir (LEVEMIR FLEXTOUCH) 100 UNIT/ML injection pen Inject 13 Units into the skin nightly  Patient taking differently: Inject into the skin 2 times daily 15 units at 9 AM and 12 units at 9pm 10/31/16  Yes Ana M Combs DO   clonazePAM (KLONOPIN) 0.5 MG tablet Take 2 mg by mouth 2 times daily as needed. Yes Historical Provider, MD   PARoxetine (PAXIL) 40 MG tablet Take 20 mg by mouth every morning    Yes Historical Provider, MD   solifenacin (VESICARE) 10 MG tablet Take 1 tablet by mouth daily. 11/13/12 2/16/16  Eduardo Fitzgerald MD       LAB DATA: Reviewed. REVIEW OF SYSTEMS:   see HPI/ Comprehensive review of systems negative except for the ones mentioned in HPI.     PHYSICAL EXAMINATION:   /60 (Site: Left Upper Arm, Position: Sitting, Cuff Size: Medium Adult) Pulse 75   Ht 5' 4\" (1.626 m)   Wt 122 lb (55.3 kg)   SpO2 98%   BMI 20.94 kg/m²      GENERAL APPEARANCE:      Alert, oriented x 3, well developed, cooperative, not in any distress, appears stated age. HEAD:                         Normocephalic, atraumatic  EYES:                          PERRLA, EOMI, lids normal, conjuctivea clear, sclera anicteric. NECK:                         Supple, symmetrical,  trachea midline, no thyromegaly, no JVD, no lymphadenopathy. LUNGS:                       Clear to auscultation bilaterally, respirations unlabored, accessory muscles are not used. HEART:                       Regular rate and rhythm, S1 and S2 normal, no murmur, rub or gallop. PMI in MCL. ABDOMEN:                 Soft, non-tender, bowel sounds are normoactive, no masses, no hepatospleenomegaly. EXTREMITY:              no bipedal edema  NEURO:                      Alert, oriented to person, place and time. Grossly intact. Musculoskeletal:         No kyphosis or scoliosis, no deformity in any extremity noted, muscle strength and tone are normal.  Skin:                            Warm and dry. No rash or obvious suspicious lesions. PSYCH:                       Mood euthymic, insight and judgement good. ASSESSMENT/PLAN:    1. Type 1 diabetes mellitus with hyperglycemia ([address]  Continue Levemir and Afrezza and continue to follow with her endocrinologist.    2. Diabetic nephropathy associated with type 1 diabetes mellitus ([address]  Not on any medication for neuropathy at this time. 3. History of Adrian-en-Y gastric bypass  Stable. Patient gets Nauseated frequently  and uses Zofran at least 3 times a day. 4. Gastroesophageal reflux disease without esophagitis  Continue Nexium  - NEXIUM 40 MG delayed release capsule; Take 1 capsule by mouth every morning (before breakfast) TAKE 1 CAPSULE BY MOUTH EVERY MORNING (BEFORE BREAKFAST).   Dispense: 30 capsule; Refill: 3    5. Persistent proteinuria  Being monitored closely. Not able to tolerate ACE or ARB's. Being followed by nephrologist periodically. Advised to control his sugars better. 6. Primary hypertension  Currently she is hypotensive and says she is not taking any medication for blood pressure. 7. Hypothyroidism due to acquired atrophy of thyroid  Continue Synthroid  - levothyroxine (SYNTHROID) 175 MCG tablet; Take 1 tablet by mouth Daily  Dispense: 30 tablet; Refill: 3    8. Bipolar 1 disorder ([address]  Advised to continue to follow with her psychiatrist and take psych medications as prescribed    9. Anxiety  Advised to continue to follow with her psychiatrist and take psych medications and prescribing    10. Migraine without status migrainosus, not intractable, unspecified migraine type  Continue Topamax and Tylenol as needed    11. Urge incontinence  Continue Ditropan XL  - oxybutynin (DITROPAN-XL) 10 MG extended release tablet; Take 1 tablet by mouth daily  Dispense: 30 tablet; Refill: 3    12. Dyspnea, unspecified type  Continue albuterol HFA as needed  - albuterol sulfate HFA (PROVENTIL;VENTOLIN;PROAIR) 108 (90 Base) MCG/ACT inhaler; Inhale 2 puffs into the lungs every 6 hours as needed for Shortness of Breath  Dispense: 1 each; Refill: 3    Advised to continue to follow COVID-19 precautions    Care discussed with patient. Questions answered and patient verbalizes understanding and agrees with plan. Medications reviewed and reconciled. Continue current medications. Appropriate prescriptions are ordered. Risks and benefits of meds are discussed. After visit summary provided. Advised to call for any problems, questions, or concerns. If symptoms worsen or don't improve as expected, to call us or go to ER. Follow up as directed, sooner if needed. No follow-ups on file. This dictation was performed with a verbal recognition program and it was checked for errors.  It is possible that there are still dictated errors within this office note. Any errors should be brought immediately to my attention for correction. All efforts were made to ensure that this office note is accurate.      Perry Horowitz MD MD

## 2022-10-24 ENCOUNTER — TELEPHONE (OUTPATIENT)
Dept: INTERNAL MEDICINE CLINIC | Age: 47
End: 2022-10-24

## 2022-10-24 NOTE — TELEPHONE ENCOUNTER
Received PA request for Nexium 40 mg DR caps, 1 cap QA, #30/30d. DX K21.9- GERD. PA completed via covermymeds. Outcome  Approvedtoday  PA Case: [id number], Status: Approved, Coverage Starts on: 10/24/2022 12:00:00 AM, Coverage Ends on: 12/31/2023 12:00:00 AM.    Pt informed. Approval scanned to chart. No further action is needed, at this time.

## 2022-12-27 RX ORDER — ONDANSETRON 4 MG/1
TABLET, ORALLY DISINTEGRATING ORAL
Qty: 30 TABLET | Refills: 0 | Status: SHIPPED | OUTPATIENT
Start: 2022-12-27

## 2023-01-17 RX ORDER — ONDANSETRON 4 MG/1
TABLET, ORALLY DISINTEGRATING ORAL
Qty: 30 TABLET | Refills: 0 | Status: SHIPPED | OUTPATIENT
Start: 2023-01-17

## 2023-01-23 ENCOUNTER — OFFICE VISIT (OUTPATIENT)
Dept: INTERNAL MEDICINE CLINIC | Age: 48
End: 2023-01-23
Payer: MEDICARE

## 2023-01-23 VITALS
OXYGEN SATURATION: 99 % | HEART RATE: 72 BPM | SYSTOLIC BLOOD PRESSURE: 118 MMHG | WEIGHT: 122 LBS | DIASTOLIC BLOOD PRESSURE: 72 MMHG | BODY MASS INDEX: 20.83 KG/M2 | HEIGHT: 64 IN

## 2023-01-23 DIAGNOSIS — G44.229 CHRONIC TENSION-TYPE HEADACHE, NOT INTRACTABLE: ICD-10-CM

## 2023-01-23 DIAGNOSIS — Z13.220 SCREENING FOR CHOLESTEROL LEVEL: ICD-10-CM

## 2023-01-23 DIAGNOSIS — F31.9 BIPOLAR 1 DISORDER (HCC): ICD-10-CM

## 2023-01-23 DIAGNOSIS — E10.21 DIABETIC NEPHROPATHY ASSOCIATED WITH TYPE 1 DIABETES MELLITUS (HCC): ICD-10-CM

## 2023-01-23 DIAGNOSIS — R80.1 PERSISTENT PROTEINURIA: ICD-10-CM

## 2023-01-23 DIAGNOSIS — Z98.84 HISTORY OF ROUX-EN-Y GASTRIC BYPASS: ICD-10-CM

## 2023-01-23 DIAGNOSIS — E10.65 TYPE 1 DIABETES MELLITUS WITH HYPERGLYCEMIA (HCC): Primary | ICD-10-CM

## 2023-01-23 DIAGNOSIS — E03.4 HYPOTHYROIDISM DUE TO ACQUIRED ATROPHY OF THYROID: ICD-10-CM

## 2023-01-23 DIAGNOSIS — K21.9 GASTROESOPHAGEAL REFLUX DISEASE WITHOUT ESOPHAGITIS: ICD-10-CM

## 2023-01-23 DIAGNOSIS — I73.9 PAD (PERIPHERAL ARTERY DISEASE) (HCC): ICD-10-CM

## 2023-01-23 DIAGNOSIS — N39.41 URGE INCONTINENCE: ICD-10-CM

## 2023-01-23 PROCEDURE — G8484 FLU IMMUNIZE NO ADMIN: HCPCS | Performed by: INTERNAL MEDICINE

## 2023-01-23 PROCEDURE — 3074F SYST BP LT 130 MM HG: CPT | Performed by: INTERNAL MEDICINE

## 2023-01-23 PROCEDURE — 99214 OFFICE O/P EST MOD 30 MIN: CPT | Performed by: INTERNAL MEDICINE

## 2023-01-23 PROCEDURE — 3046F HEMOGLOBIN A1C LEVEL >9.0%: CPT | Performed by: INTERNAL MEDICINE

## 2023-01-23 PROCEDURE — 2022F DILAT RTA XM EVC RTNOPTHY: CPT | Performed by: INTERNAL MEDICINE

## 2023-01-23 PROCEDURE — G8420 CALC BMI NORM PARAMETERS: HCPCS | Performed by: INTERNAL MEDICINE

## 2023-01-23 PROCEDURE — 1036F TOBACCO NON-USER: CPT | Performed by: INTERNAL MEDICINE

## 2023-01-23 PROCEDURE — 3078F DIAST BP <80 MM HG: CPT | Performed by: INTERNAL MEDICINE

## 2023-01-23 PROCEDURE — G8427 DOCREV CUR MEDS BY ELIG CLIN: HCPCS | Performed by: INTERNAL MEDICINE

## 2023-01-23 RX ORDER — OXYBUTYNIN CHLORIDE 10 MG/1
10 TABLET, EXTENDED RELEASE ORAL DAILY
Qty: 30 TABLET | Refills: 3 | Status: SHIPPED | OUTPATIENT
Start: 2023-01-23

## 2023-01-23 RX ORDER — LEVOTHYROXINE SODIUM 175 UG/1
175 TABLET ORAL DAILY
Qty: 30 TABLET | Refills: 3 | Status: SHIPPED | OUTPATIENT
Start: 2023-01-23

## 2023-01-23 RX ORDER — PENTOXIFYLLINE 400 MG/1
400 TABLET, EXTENDED RELEASE ORAL
Qty: 90 TABLET | Refills: 3 | Status: SHIPPED | OUTPATIENT
Start: 2023-01-23 | End: 2023-04-23

## 2023-01-23 ASSESSMENT — PATIENT HEALTH QUESTIONNAIRE - PHQ9
2. FEELING DOWN, DEPRESSED OR HOPELESS: 0
7. TROUBLE CONCENTRATING ON THINGS, SUCH AS READING THE NEWSPAPER OR WATCHING TELEVISION: 0
SUM OF ALL RESPONSES TO PHQ QUESTIONS 1-9: 0
8. MOVING OR SPEAKING SO SLOWLY THAT OTHER PEOPLE COULD HAVE NOTICED. OR THE OPPOSITE, BEING SO FIGETY OR RESTLESS THAT YOU HAVE BEEN MOVING AROUND A LOT MORE THAN USUAL: 0
SUM OF ALL RESPONSES TO PHQ QUESTIONS 1-9: 0
9. THOUGHTS THAT YOU WOULD BE BETTER OFF DEAD, OR OF HURTING YOURSELF: 0
4. FEELING TIRED OR HAVING LITTLE ENERGY: 0
5. POOR APPETITE OR OVEREATING: 0
10. IF YOU CHECKED OFF ANY PROBLEMS, HOW DIFFICULT HAVE THESE PROBLEMS MADE IT FOR YOU TO DO YOUR WORK, TAKE CARE OF THINGS AT HOME, OR GET ALONG WITH OTHER PEOPLE: 0
3. TROUBLE FALLING OR STAYING ASLEEP: 0
SUM OF ALL RESPONSES TO PHQ9 QUESTIONS 1 & 2: 0
6. FEELING BAD ABOUT YOURSELF - OR THAT YOU ARE A FAILURE OR HAVE LET YOURSELF OR YOUR FAMILY DOWN: 0
1. LITTLE INTEREST OR PLEASURE IN DOING THINGS: 0

## 2023-01-23 NOTE — PROGRESS NOTES
Name: Brigitte Ball  [id number]  Age: 56 y.o. YOB: 1975  Sex: female    CHIEF COMPLAINT:    Chief Complaint   Patient presents with    Hypertension    Diabetes      per the patient     Other     Other chronic conditions         HISTORY OF PRESENT ILLNESS:     This is a pleasant  56 y.o. female  is seen today for management of chronic medical problems and medications refills. Previous records reviewed . Patient has multiple medical problems including hypothyroid, diabetes type 1, hyperlipidemia, insomnia, anxiety, bipolar disorder, GERD, migraine headaches, allergic rhinitis , urinary incontinence etc.  She claims she is being followed by endocrinologist in [place], Dr. Harriet Garner for her diabetes. Dr. Rosalia Cotton sees her for proteinuria but avoiding ace or ARBs  because of hypotension. She is  being followed by a psychiatrist here in the [facility], Dr. Nguyen Taveras for bipolar disorder and anxiety. Doing OK otherwise  Denies CP or SOB. No fever , sore throat or cough or congestion. Denies any abdominal pain. Had UGI and SBFT  recently ordered by Dr. Janice Anderson and it was apparently ok. Appetite OK. Bowels moving [address]  Urge incontinence is better . No recent urinary tract infection  Denies any significant arthritis. Hearing is ok. Vision Ok with glasses. Denies  any significant skin lesions. Blood sugar fluctuates widely. Being followed by endocrinologist.       Her A1c on March 24, 2022 was 9.2 but on 7/19/2022 her aic was 7.9.  . Depression and anxiety better with medications. Being followed by psychiatrist.  No other complaints. She has been vaccinated for COVID-19 x 2. Did not get the booster dose. Did not get Flu shot in years and refuses flu shot. Labs from 1/11/2022 were reviewed and explained to the patient.   Also labs from 5/18/2022 and 5/2/2022 from [address] [place] [address] were reviewed and explained to the patient      Past Medical History:    Patient Active Problem List   Diagnosis Hypothyroidism    GERD (gastroesophageal reflux disease)    Urge incontinence    Constipation    LUQ abdominal pain    S/P ventral herniorrhaphy    Numbness of hand    Postsurgical nonabsorption    Mental disorder    Type 1 diabetes mellitus ([facility]    Primary hypertension    Chronic tension-type headache, not intractable    Type 1 diabetes mellitus with hyperglycemia ([address]    Internal hernia    History of Adrian-en-Y gastric bypass    Morbid obesity due to excess calories ([address]    Ventral incisional hernia without obstruction or gangrene    Left upper quadrant pain    LUQ pain    Persistent proteinuria    Diabetic nephropathy associated with type 1 diabetes mellitus ([address]    Insomnia    Dyspnea    Obsessive-compulsive disorder    Bipolar 1 disorder ([address]    Migraine without status migrainosus, not intractable    Anxiety    Urinary tract infection without hematuria        Past Surgical History:        Procedure Laterality Date    BLADDER SURGERY      has a bladder stimulator---placed and replaced in     CHOLECYSTECTOMY      COLONOSCOPY  2016    poor colonic preparation throughout    COLONOSCOPY  2016    int hem    ENDOMETRIAL ABLATION      ENDOSCOPY, COLON, DIAGNOSTIC  2016    normal appearing adrian-en-y, biopsies to rule out celiac sprue    GASTRIC BYPASS SURGERY  2013    Adrian-en-y surgery    HERNIA REPAIR      KNEE ARTHROSCOPY Left 2014    LIPECTOMY  2015    SMALL INTESTINE SURGERY  2016    internal hernia surgery then bowel resection and then abscess drainage --all in same month    [facility]  2018    Robotic laparoscopic ventral hernia repair with mesh    WISDOM TOOTH EXTRACTION         Social History:   Social History     Tobacco Use    Smoking status: Former     Types: Cigarettes     Quit date: 2015     Years since quittin.0    Smokeless tobacco: Never    Tobacco comments:     patient is doing E-cig--vape   Substance Use Topics    Alcohol use: No     Alcohol/week: 0.0 standard drinks     Comment: Quit July 23 2015. Family History:       Problem Relation Age of Onset    Cancer Mother         breast(estrogen) and skin    Glaucoma Mother     High Blood Pressure Mother     High Cholesterol Mother     Cancer Sister         breast (estrogen)    High Blood Pressure Father     High Cholesterol Father        Allergies:  Latex, Cephalexin, Lyrica [pregabalin], Mirapex [pramipexole], Penicillins, Prednisone, Sulfa antibiotics, Midol [ibuprofen], Phenergan [promethazine hcl], Babys only organic-dairy [alimentum], Ativan [lorazepam], Corn-containing products, Keflex [cephalexin], Meperidine, Morphine, Nsaids, Vicodin [hydrocodone-acetaminophen], and Vistaril [hydroxyzine hcl]    Current Medications :      Prior to Admission medications    Medication Sig Start Date End Date Taking? Authorizing Provider   levothyroxine (SYNTHROID) 175 MCG tablet Take 1 tablet by mouth Daily 1/23/23  Yes Yamileth Garcia MD   oxybutynin (DITROPAN-XL) 10 MG extended release tablet Take 1 tablet by mouth daily 1/23/23  Yes Yamileth Garcia MD   pentoxifylline (TRENTAL) 400 MG extended release tablet Take 1 tablet by mouth 3 times daily (with meals) 1/23/23 4/23/23 Yes Yamileth Garcia MD   ondansetron (ZOFRAN-ODT) 4 MG disintegrating tablet DISSOLVE 1 TABLET IN MOUTH EVERY 4 HOURS AS NEEDED FOR NAUSEA OR VOMITING 1/17/23  Yes Yamileth Garcia MD   NEXIUM 40 MG delayed release capsule Take 1 capsule by mouth every morning (before breakfast) TAKE 1 CAPSULE BY MOUTH EVERY MORNING (BEFORE BREAKFAST).  10/18/22  Yes Yamileth Garcia MD   albuterol sulfate HFA (PROVENTIL;VENTOLIN;PROAIR) 108 (90 Base) MCG/ACT inhaler Inhale 2 puffs into the lungs every 6 hours as needed for Shortness of Breath 10/18/22  Yes Yamileth Garcia MD   vitamin D (CHOLECALCIFEROL) 25 MCG (1000 UT) TABS tablet Take 1 tablet by mouth daily 10/18/22  Yes Yamileth Garcia MD   traZODone (DESYREL) 50 MG tablet TAKE 1/2 TO 1 (ONE-HALF TO ONE) TABLET BY MOUTH AT BEDTIME WITH FOOD AS NEEDED FOR SLEEP 4/6/22  Yes Historical Provider, MD   cetirizine (ZYRTEC) 10 MG tablet Take 10 mg by mouth daily 10/13/21  Yes Historical Provider, MD   senna (SENOKOT) 8.6 MG tablet Take 1 tablet by mouth every other day   Yes Historical Provider, MD   melatonin 10 MG CAPS capsule Take 10 mg by mouth nightly   Yes Historical Provider, MD   rosuvastatin (CRESTOR) 5 MG tablet Take 5 mg by mouth daily   Yes Historical Provider, MD   OXcarbazepine (TRILEPTAL) 600 MG tablet Take 600 mg by mouth 2 times daily   Yes Historical Provider, MD   topiramate (TOPAMAX) 200 MG tablet Take 200 mg by mouth 3 times daily   Yes Historical Provider, MD   Insulin Regular Human 12 units POWD Inhale 1 Cartridge into the lungs 4 times daily Pt takes only 8 units at dinner time and adjusts dose at bedtime due to bld sugar   Yes Historical Provider, MD   Cholecalciferol (VITAMIN D3) 16714 units TABS Take 1 tablet by mouth daily   Yes Historical Provider, MD   Biotin 71093 MCG TBDP Take 1 tablet by mouth daily   Yes Historical Provider, MD   insulin detemir (LEVEMIR FLEXTOUCH) 100 UNIT/ML injection pen Inject 13 Units into the skin nightly  Patient taking differently: Inject into the skin 2 times daily 15 units at 9 AM and 12 units at 9pm 10/31/16  Yes Ana M Combs DO   clonazePAM (KLONOPIN) 0.5 MG tablet Take 2 mg by mouth 2 times daily as needed. Yes Historical Provider, MD   PARoxetine (PAXIL) 40 MG tablet Take 20 mg by mouth every morning    Yes Historical Provider, MD   solifenacin (VESICARE) 10 MG tablet Take 1 tablet by mouth daily. 11/13/12 2/16/16  Kamaljit Diaz MD       LAB DATA: Reviewed. REVIEW OF SYSTEMS:   see HPI/ Comprehensive review of systems negative except for the ones mentioned in HPI.     PHYSICAL EXAMINATION:   /72 (Site: Left Upper Arm, Position: Sitting, Cuff Size: Medium Adult)   Pulse 72   Ht 5' 4\" (1.626 m)   Wt 122 lb (55.3 kg)   SpO2 99%   BMI 20.94 kg/m²      GENERAL APPEARANCE:      Alert, oriented x 3, well developed, cooperative, not in any distress, appears stated age. HEAD:                         Normocephalic, atraumatic  EYES:                          PERRLA, EOMI, lids normal, conjuctivea clear, sclera anicteric. NECK:                         Supple, symmetrical,  trachea midline, no thyromegaly, no JVD, no lymphadenopathy. LUNGS:                       Clear to auscultation bilaterally, respirations unlabored, accessory muscles are not used. HEART:                       Regular rate and rhythm, S1 and S2 normal, no murmur, rub or gallop. PMI in MCL. ABDOMEN:                 Soft, non-tender, bowel sounds are normoactive, no masses, no hepatospleenomegaly. EXTREMITY:              no bipedal edema  NEURO:                      Alert, oriented to person, place and time. Grossly intact. Musculoskeletal:         No kyphosis or scoliosis, no deformity in any extremity noted, muscle strength and tone are normal.  Skin:                            Warm and dry. No rash or obvious suspicious lesions. PSYCH:                       Mood euthymic, insight and judgement good. ASSESSMENT/PLAN:    1. Type 1 diabetes mellitus with hyperglycemia (HCC)  Continue Levemir and Humulin and continue to follow with her endocrinologist as per her recommendations. Advised low sugar diet and exercise    - Comprehensive Metabolic Panel; Future  - Hemoglobin A1C; Future    2. Diabetic nephropathy associated with type 1 diabetes mellitus ([facility]  Not on any medication for neuropathy at this time. - Comprehensive Metabolic Panel; Future    3. Persistent proteinuria  Patient is not on ACE or ARB because of low blood pressure. Being followed by nephrologist periodically. Advised to control her sugar better    4.  Hypothyroidism due to acquired atrophy of thyroid  Continue Synthroid  - levothyroxine (SYNTHROID) 175 MCG tablet; Take 1 tablet by mouth Daily  Dispense: 30 tablet; Refill: 3  - TSH; Future    5. Urge incontinence  Continue Ditropan XL  - oxybutynin (DITROPAN-XL) 10 MG extended release tablet; Take 1 tablet by mouth daily  Dispense: 30 tablet; Refill: 3    6. Bipolar 1 disorder ([address]  Advised to continue to follow with her psychiatrist and take psych medications regularly    7. History of Adrian-en-Y gastric bypass  Stable, patient gets nauseated frequently and uses Zofran at least 3 times a day. 8. Gastroesophageal reflux disease without esophagitis  Patient on Nexium    9. Chronic tension-type headache, not intractable  Continue Topamax and Tylenol as needed    10. PAD (peripheral artery disease) ([facility])  Continue Trental and Crestor  - pentoxifylline (TRENTAL) 400 MG extended release tablet; Take 1 tablet by mouth 3 times daily (with meals)  Dispense: 90 tablet; Refill: 3    11. Screening for cholesterol level  Advise low-cholesterol diet  - Lipid, Fasting; Future    Advised to continue to follow COVID-19 precautions    Care discussed with patient. Questions answered and patient verbalizes understanding and agrees with plan. Medications reviewed and reconciled. Continue current medications. Appropriate prescriptions are ordered. Risks and benefits of meds are discussed. After visit summary provided. Advised to call for any problems, questions, or concerns. If symptoms worsen or don't improve as expected, to call us or go to ER. Follow up as directed, sooner if needed. Return in about 3 months (around 4/23/2023). This dictation was performed with a verbal recognition program and it was checked for errors. It is possible that there are still dictated errors within this office note. Any errors should be brought immediately to my attention for correction. All efforts were made to ensure that this office note is accurate.      Omid Ward Alfa Whitfield MD MD

## 2023-01-29 NOTE — TELEPHONE ENCOUNTER
Patient called stating pain medication is not strong enough. Called patient advised to alternate between ibuprofen and medication.  Left message Vianca

## 2023-01-30 RX ORDER — ONDANSETRON 4 MG/1
TABLET, ORALLY DISINTEGRATING ORAL
Qty: 30 TABLET | Refills: 0 | Status: SHIPPED | OUTPATIENT
Start: 2023-01-30

## 2023-02-06 ENCOUNTER — HOSPITAL ENCOUNTER (OUTPATIENT)
Age: 48
Discharge: HOME | End: 2023-02-06
Payer: MEDICARE

## 2023-02-06 DIAGNOSIS — Z13.220 SCREENING FOR CHOLESTEROL LEVEL: ICD-10-CM

## 2023-02-06 DIAGNOSIS — K21.9 GASTROESOPHAGEAL REFLUX DISEASE WITHOUT ESOPHAGITIS: ICD-10-CM

## 2023-02-06 DIAGNOSIS — E03.4 HYPOTHYROIDISM DUE TO ACQUIRED ATROPHY OF THYROID: ICD-10-CM

## 2023-02-06 DIAGNOSIS — E10.21 DIABETIC NEPHROPATHY ASSOCIATED WITH TYPE 1 DIABETES MELLITUS (HCC): ICD-10-CM

## 2023-02-06 DIAGNOSIS — E10.65 TYPE 1 DIABETES MELLITUS WITH HYPERGLYCEMIA (HCC): ICD-10-CM

## 2023-02-06 LAB
ALBUMIN SERPL-MCNC: 3.8 GM/DL (ref 3.4–5)
ALP BLD-CCNC: 139 IU/L (ref 40–128)
ALT SERPL-CCNC: 20 U/L (ref 10–40)
ANION GAP SERPL CALCULATED.3IONS-SCNC: 8 MMOL/L (ref 4–16)
AST SERPL-CCNC: 18 IU/L (ref 15–37)
BASOPHILS ABSOLUTE: 0.1 K/CU MM
BASOPHILS RELATIVE PERCENT: 0.8 % (ref 0–1)
BILIRUB SERPL-MCNC: 0.2 MG/DL (ref 0–1)
BUN SERPL-MCNC: 12 MG/DL (ref 6–23)
CALCIUM SERPL-MCNC: 8.8 MG/DL (ref 8.3–10.6)
CHLORIDE BLD-SCNC: 111 MMOL/L (ref 99–110)
CHOLESTEROL, FASTING: 149 MG/DL
CO2: 23 MMOL/L (ref 21–32)
CREAT SERPL-MCNC: 0.6 MG/DL (ref 0.6–1.1)
DIFFERENTIAL TYPE: ABNORMAL
EOSINOPHILS ABSOLUTE: 0.1 K/CU MM
EOSINOPHILS RELATIVE PERCENT: 1.4 % (ref 0–3)
ESTIMATED AVERAGE GLUCOSE: 192 MG/DL
GFR SERPL CREATININE-BSD FRML MDRD: >60 ML/MIN/1.73M2
GLUCOSE SERPL-MCNC: 209 MG/DL (ref 70–99)
HBA1C MFR BLD: 8.3 % (ref 4.2–6.3)
HCT VFR BLD CALC: 34.1 % (ref 37–47)
HDLC SERPL-MCNC: 52 MG/DL
HEMOGLOBIN: 11 GM/DL (ref 12.5–16)
IMMATURE NEUTROPHIL %: 0 % (ref 0–0.43)
LDLC SERPL CALC-MCNC: 85 MG/DL
LYMPHOCYTES ABSOLUTE: 2.8 K/CU MM
LYMPHOCYTES RELATIVE PERCENT: 39.3 % (ref 24–44)
MCH RBC QN AUTO: 28.8 PG (ref 27–31)
MCHC RBC AUTO-ENTMCNC: 32.3 % (ref 32–36)
MCV RBC AUTO: 89.3 FL (ref 78–100)
MONOCYTES ABSOLUTE: 0.4 K/CU MM
MONOCYTES RELATIVE PERCENT: 5.6 % (ref 0–4)
NUCLEATED RBC %: 0 %
PDW BLD-RTO: 12.5 % (ref 11.7–14.9)
PLATELET # BLD: 326 K/CU MM (ref 140–440)
PMV BLD AUTO: 11 FL (ref 7.5–11.1)
POTASSIUM SERPL-SCNC: 4.5 MMOL/L (ref 3.5–5.1)
RBC # BLD: 3.82 M/CU MM (ref 4.2–5.4)
SEGMENTED NEUTROPHILS ABSOLUTE COUNT: 3.8 K/CU MM
SEGMENTED NEUTROPHILS RELATIVE PERCENT: 52.9 % (ref 36–66)
SODIUM BLD-SCNC: 142 MMOL/L (ref 135–145)
TOTAL IMMATURE NEUTOROPHIL: 0 K/CU MM
TOTAL NUCLEATED RBC: 0 K/CU MM
TOTAL PROTEIN: 5.7 GM/DL (ref 6.4–8.2)
TRIGLYCERIDE, FASTING: 61 MG/DL
TSH SERPL DL<=0.005 MIU/L-ACNC: 0.09 UIU/ML (ref 0.27–4.2)
WBC # BLD: 7.2 K/CU MM (ref 4–10.5)

## 2023-02-06 PROCEDURE — 84443 ASSAY THYROID STIM HORMONE: CPT

## 2023-02-06 PROCEDURE — 36415 COLL VENOUS BLD VENIPUNCTURE: CPT

## 2023-02-06 PROCEDURE — 80061 LIPID PANEL: CPT

## 2023-02-06 PROCEDURE — 80053 COMPREHEN METABOLIC PANEL: CPT

## 2023-02-06 PROCEDURE — 83036 HEMOGLOBIN GLYCOSYLATED A1C: CPT

## 2023-02-06 PROCEDURE — 85025 COMPLETE CBC W/AUTO DIFF WBC: CPT

## 2023-03-17 DIAGNOSIS — K21.9 GASTROESOPHAGEAL REFLUX DISEASE WITHOUT ESOPHAGITIS: ICD-10-CM

## 2023-03-20 ENCOUNTER — HOSPITAL ENCOUNTER (OUTPATIENT)
Age: 48
Discharge: HOME | End: 2023-03-20
Payer: MEDICARE

## 2023-03-20 LAB
ALBUMIN SERPL-MCNC: 3.8 GM/DL (ref 3.4–5)
ALP BLD-CCNC: 107 IU/L (ref 40–128)
ALT SERPL-CCNC: 16 U/L (ref 10–40)
ANION GAP SERPL CALCULATED.3IONS-SCNC: 10 MMOL/L (ref 4–16)
AST SERPL-CCNC: 16 IU/L (ref 15–37)
BILIRUB SERPL-MCNC: 0.2 MG/DL (ref 0–1)
BUN SERPL-MCNC: 16 MG/DL (ref 6–23)
CALCIUM SERPL-MCNC: 8.5 MG/DL (ref 8.3–10.6)
CHLORIDE BLD-SCNC: 107 MMOL/L (ref 99–110)
CO2: 21 MMOL/L (ref 21–32)
CREAT SERPL-MCNC: 0.6 MG/DL (ref 0.6–1.1)
CREATININE URINE: 124.2 MG/DL (ref 28–217)
GFR SERPL CREATININE-BSD FRML MDRD: >60 ML/MIN/1.73M2
GLUCOSE SERPL-MCNC: 174 MG/DL (ref 70–99)
POTASSIUM SERPL-SCNC: 3.7 MMOL/L (ref 3.5–5.1)
PROT/CREAT RATIO, UR: 0.2
SODIUM BLD-SCNC: 138 MMOL/L (ref 135–145)
TOTAL PROTEIN: 5.7 GM/DL (ref 6.4–8.2)
URINE TOTAL PROTEIN: 28.4 MG/DL

## 2023-03-20 PROCEDURE — 84156 ASSAY OF PROTEIN URINE: CPT

## 2023-03-20 PROCEDURE — 82570 ASSAY OF URINE CREATININE: CPT

## 2023-03-20 PROCEDURE — 80053 COMPREHEN METABOLIC PANEL: CPT

## 2023-03-20 PROCEDURE — 36415 COLL VENOUS BLD VENIPUNCTURE: CPT

## 2023-03-22 ENCOUNTER — TELEPHONE (OUTPATIENT)
Dept: INTERNAL MEDICINE CLINIC | Age: 48
End: 2023-03-22

## 2023-03-22 NOTE — TELEPHONE ENCOUNTER
Received PA request for Nexium 40 mg DR cap, Take 1 cap [facility], #30/30d. DX K21.9- GERD. PA previously approved until 12/31/23, but pt has changed insurance. PA completed via covermymeds. PA denied, citing: Denied. This drug is not on our formulary. Your doctor gave us a statement (and medical records) supporting your request. This statement and our records did not show that you have tried the generic drug that is on the formulary. This generic product has the same active ingredient as the brand name drug. Based on the condition provided, the other drug(s) on the formulary is/are: dexlansoprazole oral capsule delayed release (30mg, and 60mg), lansoprazole oral tablet delayed release dispersible (15mg, and 30mg), omeprazole oral capsule delayed release (10mg, 20mg, and 40mg), pantoprazole sodium oral tablet delayed release (20mg, and 40mg). Pt has tried/failed the following:  Esomeprazole 40 mg- 2012  Famotidine 20 mg- 2015  Protonix 40 mg- 2014, 2015, 2016    Pt has been on Nexium (Brand) since 02/2015, and has remained stable.      Appeal faxed to [facility] 3/22/23

## 2023-03-24 DIAGNOSIS — K21.9 GASTROESOPHAGEAL REFLUX DISEASE WITHOUT ESOPHAGITIS: ICD-10-CM

## 2023-03-24 NOTE — TELEPHONE ENCOUNTER
PA approved 3/17/23 \"until further notice. Approval scanned to chart. Pt informed via VM (okay per HIPAA). No further action is needed, at this time.

## 2023-04-05 ENCOUNTER — TELEPHONE (OUTPATIENT)
Dept: INTERNAL MEDICINE CLINIC | Age: 48
End: 2023-04-05

## 2023-04-05 NOTE — TELEPHONE ENCOUNTER
PROVIDER FEEDBACK LOOP CALLED 3X     Patient:Zaira Torres  : 1975  Referring Provider: Chandrakant Rey  Referral Type:  Imaging     Procedures:  [id number] - MINISTERIO DIGITAL SCREEN W OR WO CAD BILATERAL  Date Service Ordered 2022        We were unable to reach Herlinda John to schedule the test ordered by your office after 3 call attempts. Please call/follow up with your patient to explain the significance of the ordered test and direct the patient to call Central Scheduling to schedule the test at their earliest convenience. Please complete one of the following actions from Quick Actions buttons:     Route to Provider:  Route message to ordering provider to seek next steps in care plan. Telephone Encounter:  Telephone encounter will open. Call patient to explain significance of ordered test and direct patient to call Central Scheduling to schedule test then Document details of call. Open Referral:  Review referral notes or details if needed. Close Referral:  Referral will open. Document in Notes section of referral why the referral is being closed. Examples of referral closure:  Patient had test done outside of of an office in the [address] [place] [address], Patient refuses test, Patient no longer having symptoms, Unable to reach patient. Only close the referral if you are sure the test will not proceed.      Thank you     Pre-Access Scheduling Team

## 2023-04-17 ENCOUNTER — OFFICE VISIT (OUTPATIENT)
Dept: INTERNAL MEDICINE CLINIC | Age: 48
End: 2023-04-17
Payer: MEDICARE

## 2023-04-17 VITALS
BODY MASS INDEX: 20.66 KG/M2 | OXYGEN SATURATION: 97 % | WEIGHT: 121 LBS | HEART RATE: 94 BPM | SYSTOLIC BLOOD PRESSURE: 110 MMHG | DIASTOLIC BLOOD PRESSURE: 74 MMHG | HEIGHT: 64 IN

## 2023-04-17 DIAGNOSIS — R80.1 PERSISTENT PROTEINURIA: ICD-10-CM

## 2023-04-17 DIAGNOSIS — N39.41 URGE INCONTINENCE: ICD-10-CM

## 2023-04-17 DIAGNOSIS — Z98.84 HISTORY OF ROUX-EN-Y GASTRIC BYPASS: ICD-10-CM

## 2023-04-17 DIAGNOSIS — I10 PRIMARY HYPERTENSION: ICD-10-CM

## 2023-04-17 DIAGNOSIS — B37.0 ORAL THRUSH: Primary | ICD-10-CM

## 2023-04-17 DIAGNOSIS — E03.4 HYPOTHYROIDISM DUE TO ACQUIRED ATROPHY OF THYROID: ICD-10-CM

## 2023-04-17 DIAGNOSIS — E10.21 DIABETIC NEPHROPATHY ASSOCIATED WITH TYPE 1 DIABETES MELLITUS (HCC): ICD-10-CM

## 2023-04-17 DIAGNOSIS — F41.9 ANXIETY: ICD-10-CM

## 2023-04-17 DIAGNOSIS — Z86.39 HISTORY OF MORBID OBESITY: ICD-10-CM

## 2023-04-17 DIAGNOSIS — K21.9 GASTROESOPHAGEAL REFLUX DISEASE WITHOUT ESOPHAGITIS: ICD-10-CM

## 2023-04-17 DIAGNOSIS — F31.9 BIPOLAR 1 DISORDER (HCC): ICD-10-CM

## 2023-04-17 DIAGNOSIS — E10.65 TYPE 1 DIABETES MELLITUS WITH HYPERGLYCEMIA (HCC): ICD-10-CM

## 2023-04-17 DIAGNOSIS — G43.909 MIGRAINE WITHOUT STATUS MIGRAINOSUS, NOT INTRACTABLE, UNSPECIFIED MIGRAINE TYPE: ICD-10-CM

## 2023-04-17 PROCEDURE — 1036F TOBACCO NON-USER: CPT | Performed by: INTERNAL MEDICINE

## 2023-04-17 PROCEDURE — G8427 DOCREV CUR MEDS BY ELIG CLIN: HCPCS | Performed by: INTERNAL MEDICINE

## 2023-04-17 PROCEDURE — 99214 OFFICE O/P EST MOD 30 MIN: CPT | Performed by: INTERNAL MEDICINE

## 2023-04-17 PROCEDURE — 3078F DIAST BP <80 MM HG: CPT | Performed by: INTERNAL MEDICINE

## 2023-04-17 PROCEDURE — 3074F SYST BP LT 130 MM HG: CPT | Performed by: INTERNAL MEDICINE

## 2023-04-17 PROCEDURE — G8420 CALC BMI NORM PARAMETERS: HCPCS | Performed by: INTERNAL MEDICINE

## 2023-04-17 PROCEDURE — 3052F HG A1C>EQUAL 8.0%<EQUAL 9.0%: CPT | Performed by: INTERNAL MEDICINE

## 2023-04-17 PROCEDURE — 2022F DILAT RTA XM EVC RTNOPTHY: CPT | Performed by: INTERNAL MEDICINE

## 2023-04-17 RX ORDER — OXYBUTYNIN CHLORIDE 10 MG/1
10 TABLET, EXTENDED RELEASE ORAL DAILY
Qty: 30 TABLET | Refills: 3 | Status: SHIPPED | OUTPATIENT
Start: 2023-04-17

## 2023-04-17 RX ORDER — LEVOTHYROXINE SODIUM 175 UG/1
175 TABLET ORAL DAILY
Qty: 30 TABLET | Refills: 3 | Status: SHIPPED | OUTPATIENT
Start: 2023-04-17

## 2023-04-17 RX ORDER — ALBUTEROL SULFATE 90 UG/1
2 AEROSOL, METERED RESPIRATORY (INHALATION) EVERY 6 HOURS PRN
Qty: 1 EACH | Refills: 3 | Status: SHIPPED | OUTPATIENT
Start: 2023-04-17

## 2023-04-17 SDOH — ECONOMIC STABILITY: INCOME INSECURITY: HOW HARD IS IT FOR YOU TO PAY FOR THE VERY BASICS LIKE FOOD, HOUSING, MEDICAL CARE, AND HEATING?: NOT HARD AT ALL

## 2023-04-17 SDOH — ECONOMIC STABILITY: FOOD INSECURITY: WITHIN THE PAST 12 MONTHS, YOU WORRIED THAT YOUR FOOD WOULD RUN OUT BEFORE YOU GOT MONEY TO BUY MORE.: NEVER TRUE

## 2023-04-17 SDOH — ECONOMIC STABILITY: HOUSING INSECURITY
IN THE LAST 12 MONTHS, WAS THERE A TIME WHEN YOU DID NOT HAVE A STEADY PLACE TO SLEEP OR SLEPT IN A SHELTER (INCLUDING NOW)?: NO

## 2023-04-17 SDOH — ECONOMIC STABILITY: FOOD INSECURITY: WITHIN THE PAST 12 MONTHS, THE FOOD YOU BOUGHT JUST DIDN'T LAST AND YOU DIDN'T HAVE MONEY TO GET MORE.: NEVER TRUE

## 2023-04-17 NOTE — PROGRESS NOTES
patient.         Past Medical History:    Patient Active Problem List   Diagnosis    Hypothyroidism    GERD (gastroesophageal reflux disease)    Urge incontinence    Constipation    LUQ abdominal pain    S/P ventral herniorrhaphy    Numbness of hand    Postsurgical nonabsorption    Mental disorder    Primary hypertension    Chronic tension-type headache, not intractable    Type 1 diabetes mellitus with hyperglycemia ([address]    Internal hernia    History of Adrian-en-Y gastric bypass    Morbid obesity due to excess calories ([address]    Ventral incisional hernia without obstruction or gangrene    Left upper quadrant pain    LUQ pain    Persistent proteinuria    Diabetic nephropathy associated with type 1 diabetes mellitus ([address]    Insomnia    Dyspnea    Obsessive-compulsive disorder    Bipolar 1 disorder ([address]    Migraine without status migrainosus, not intractable    Anxiety    Urinary tract infection without hematuria        Past Surgical History:        Procedure Laterality Date    BLADDER SURGERY      has a bladder stimulator---placed and replaced in     CHOLECYSTECTOMY      COLONOSCOPY  2016    poor colonic preparation throughout    COLONOSCOPY  2016    int hem    ENDOMETRIAL ABLATION      ENDOSCOPY, COLON, DIAGNOSTIC  2016    normal appearing adrian-en-y, biopsies to rule out celiac sprue    GASTRIC BYPASS SURGERY  2013    Adrian-en-y surgery    HERNIA REPAIR      KNEE ARTHROSCOPY Left 2014    LIPECTOMY  2015    SMALL INTESTINE SURGERY  2016    internal hernia surgery then bowel resection and then abscess drainage --all in same month    [address] [facility]  2018    Robotic laparoscopic ventral hernia repair with mesh    WISDOM TOOTH EXTRACTION         Social History:   Social History     Tobacco Use    Smoking status: Former     Types: Cigarettes     Quit date: 2015     Years since quittin.2    Smokeless tobacco:

## 2023-04-20 LAB — DIABETIC RETINOPATHY: NEGATIVE

## 2023-07-10 ENCOUNTER — OFFICE VISIT (OUTPATIENT)
Dept: INTERNAL MEDICINE CLINIC | Age: 48
End: 2023-07-10
Payer: MEDICARE

## 2023-07-10 VITALS
BODY MASS INDEX: 21.17 KG/M2 | SYSTOLIC BLOOD PRESSURE: 118 MMHG | DIASTOLIC BLOOD PRESSURE: 64 MMHG | OXYGEN SATURATION: 99 % | HEART RATE: 76 BPM | HEIGHT: 64 IN | WEIGHT: 124 LBS

## 2023-07-10 DIAGNOSIS — G43.909 MIGRAINE WITHOUT STATUS MIGRAINOSUS, NOT INTRACTABLE, UNSPECIFIED MIGRAINE TYPE: ICD-10-CM

## 2023-07-10 DIAGNOSIS — R21 RASH: ICD-10-CM

## 2023-07-10 DIAGNOSIS — K14.6 PAINFUL TONGUE: Primary | ICD-10-CM

## 2023-07-10 DIAGNOSIS — I73.9 PAD (PERIPHERAL ARTERY DISEASE) (HCC): ICD-10-CM

## 2023-07-10 DIAGNOSIS — E03.4 HYPOTHYROIDISM DUE TO ACQUIRED ATROPHY OF THYROID: ICD-10-CM

## 2023-07-10 DIAGNOSIS — E10.65 TYPE 1 DIABETES MELLITUS WITH HYPERGLYCEMIA (HCC): ICD-10-CM

## 2023-07-10 DIAGNOSIS — N39.41 URGE INCONTINENCE: ICD-10-CM

## 2023-07-10 DIAGNOSIS — K21.9 GASTROESOPHAGEAL REFLUX DISEASE WITHOUT ESOPHAGITIS: ICD-10-CM

## 2023-07-10 LAB
CHP ED QC CHECK: NORMAL
GLUCOSE BLD-MCNC: 121 MG/DL

## 2023-07-10 PROCEDURE — 3052F HG A1C>EQUAL 8.0%<EQUAL 9.0%: CPT | Performed by: INTERNAL MEDICINE

## 2023-07-10 PROCEDURE — 1036F TOBACCO NON-USER: CPT | Performed by: INTERNAL MEDICINE

## 2023-07-10 PROCEDURE — 2022F DILAT RTA XM EVC RTNOPTHY: CPT | Performed by: INTERNAL MEDICINE

## 2023-07-10 PROCEDURE — G8420 CALC BMI NORM PARAMETERS: HCPCS | Performed by: INTERNAL MEDICINE

## 2023-07-10 PROCEDURE — G8427 DOCREV CUR MEDS BY ELIG CLIN: HCPCS | Performed by: INTERNAL MEDICINE

## 2023-07-10 PROCEDURE — 82962 GLUCOSE BLOOD TEST: CPT | Performed by: INTERNAL MEDICINE

## 2023-07-10 PROCEDURE — 99214 OFFICE O/P EST MOD 30 MIN: CPT | Performed by: INTERNAL MEDICINE

## 2023-07-10 RX ORDER — LEVOTHYROXINE SODIUM 175 UG/1
175 TABLET ORAL DAILY
Qty: 30 TABLET | Refills: 3 | Status: SHIPPED | OUTPATIENT
Start: 2023-07-10

## 2023-07-10 RX ORDER — PENTOXIFYLLINE 400 MG/1
400 TABLET, EXTENDED RELEASE ORAL
Qty: 90 TABLET | Refills: 3 | Status: SHIPPED | OUTPATIENT
Start: 2023-07-10 | End: 2023-10-08

## 2023-07-10 RX ORDER — OXYBUTYNIN CHLORIDE 10 MG/1
10 TABLET, EXTENDED RELEASE ORAL DAILY
Qty: 30 TABLET | Refills: 3 | Status: SHIPPED | OUTPATIENT
Start: 2023-07-10

## 2023-07-10 RX ORDER — ALBUTEROL SULFATE 90 UG/1
2 AEROSOL, METERED RESPIRATORY (INHALATION) EVERY 6 HOURS PRN
Qty: 1 EACH | Refills: 3 | Status: SHIPPED | OUTPATIENT
Start: 2023-07-10

## 2023-07-10 NOTE — PROGRESS NOTES
Name: Hannah Adrian  [id number]  Age: 53 y.o. YOB: 1975  Sex: female    CHIEF COMPLAINT:    Chief Complaint   Patient presents with    Hypertension    Diabetes    Other     Other chronic conditions         HISTORY OF PRESENT ILLNESS:     This is a pleasant  53 y.o. female  is seen today for management of chronic medical problems and medications refills. Previous records reviewed . Patient has multiple medical problems including hypothyroid, diabetes type 1, hyperlipidemia, insomnia, anxiety, bipolar disorder, GERD, migraine headaches, allergic rhinitis , urinary incontinence etc.    She claims she is being followed by endocrinologist in [place], Dr. Kenton Ellis for her diabetes. She sees Dr. Margie Linton for proteinuria but avoiding ACE or ARB's because of  Hypotension. She also sees a psychiatrist for her bipolar disorder and anxiety depression. Patient claimed that she has developed spots on her tongue several days ago and was treated with nystatin swish and swallow but did not help her. Patient is concerned about the lesions on her tongue. She claims she did see ENT in the past but did not do anything for her. She is willing to see them again for further recommendations. Doing OK otherwise  Denies CP or SOB. No fever , sore throat or cough or congestion. Denies any abdominal pain. Had UGI and SBFT  recently ordered by Dr. Lili Wong and it was apparently ok. Appetite OK. Bowels moving [address] [facility]. Urge incontinence is better . No recent urinary tract infection  Denies any significant arthritis. Hearing is ok. Vision Ok with glasses. Denies  any significant skin lesions. Blood sugar fluctuates widely. Being followed by endocrinologist.       Her A1c on March 24, 2022 was 9.2 but on 7/19/2022 her aic was 7.9. Today her sugar is 121  Her hemoglobin A1c on 2/6/2023 was 8.3  . Depression and anxiety better with medications. Being followed by psychiatrist.  No other complaints.   She

## 2023-07-18 ENCOUNTER — HOSPITAL ENCOUNTER (OUTPATIENT)
Age: 48
Discharge: HOME | End: 2023-07-18
Payer: MEDICARE

## 2023-07-18 DIAGNOSIS — R21 RASH: ICD-10-CM

## 2023-07-18 PROCEDURE — 86430 RHEUMATOID FACTOR TEST QUAL: CPT

## 2023-07-18 PROCEDURE — 86038 ANTINUCLEAR ANTIBODIES: CPT

## 2023-07-18 PROCEDURE — 36415 COLL VENOUS BLD VENIPUNCTURE: CPT

## 2023-07-18 PROCEDURE — 86039 ANTINUCLEAR ANTIBODIES (ANA): CPT

## 2023-07-20 LAB
NUCLEAR IGG SER QL IA: DETECTED
RHEUMATOID FACTOR: <10 IU/ML (ref 0–14)

## 2023-07-21 DIAGNOSIS — R76.8 POSITIVE ANA (ANTINUCLEAR ANTIBODY): Primary | ICD-10-CM

## 2023-07-21 DIAGNOSIS — R21 RASH: ICD-10-CM

## 2023-07-21 DIAGNOSIS — K14.6 PAINFUL TONGUE: ICD-10-CM

## 2023-07-21 LAB
ANTINUCLEAR ANTIBODY, HEP-2, IGG: NORMAL
INTERPRETATION, OPI7M: NORMAL

## 2023-08-02 ENCOUNTER — TELEPHONE (OUTPATIENT)
Dept: INTERNAL MEDICINE CLINIC | Age: 48
End: 2023-08-02

## 2023-08-02 NOTE — TELEPHONE ENCOUNTER
Called  office today to see if that received the referral. They had received the referral and will be working on hers today.

## 2023-08-02 NOTE — TELEPHONE ENCOUNTER
----- Message from Gilbert Varela sent at 8/2/2023  9:03 AM EDT -----  Subject: Referral Request    Reason for referral request? Pt needs a new referral or a Rheumatologist.   Pt said the office and doctor they referred her too is still saying 3   weeks later they don't have there referral . Please call her back with new   referral for a different one please. Provider patient wants to be referred to(if known):     Provider Phone Number(if known): Additional Information for Provider? Pt said she spoke with the nurse and   said the Rheumatologist Kesha Peterson MD is still saying they have   not received the referral. Pt is wanting someone in office to reach out to   this doctor to see what's going and possibly get her scheduled for the   afternoon if possible.  Pt said Dr. Almaz Villalobos office keeps saying they   aren't getting the fax.   ---------------------------------------------------------------------------  --------------  Omar [place] Stanford    [phone]; OK to leave message on voicemail  ---------------------------------------------------------------------------  --------------

## 2023-09-28 DIAGNOSIS — E03.4 HYPOTHYROIDISM DUE TO ACQUIRED ATROPHY OF THYROID: ICD-10-CM

## 2023-09-28 RX ORDER — LEVOTHYROXINE SODIUM 175 UG/1
175 TABLET ORAL DAILY
Qty: 30 TABLET | Refills: 0 | Status: SHIPPED | OUTPATIENT
Start: 2023-09-28

## 2023-10-09 ENCOUNTER — OFFICE VISIT (OUTPATIENT)
Dept: INTERNAL MEDICINE CLINIC | Age: 48
End: 2023-10-09
Payer: MEDICARE

## 2023-10-09 VITALS
BODY MASS INDEX: 21.17 KG/M2 | WEIGHT: 124 LBS | DIASTOLIC BLOOD PRESSURE: 68 MMHG | HEART RATE: 64 BPM | OXYGEN SATURATION: 99 % | SYSTOLIC BLOOD PRESSURE: 97 MMHG | HEIGHT: 64 IN

## 2023-10-09 DIAGNOSIS — G43.909 MIGRAINE WITHOUT STATUS MIGRAINOSUS, NOT INTRACTABLE, UNSPECIFIED MIGRAINE TYPE: ICD-10-CM

## 2023-10-09 DIAGNOSIS — I73.9 PAD (PERIPHERAL ARTERY DISEASE) (HCC): ICD-10-CM

## 2023-10-09 DIAGNOSIS — E03.4 HYPOTHYROIDISM DUE TO ACQUIRED ATROPHY OF THYROID: ICD-10-CM

## 2023-10-09 DIAGNOSIS — K14.6 PAINFUL TONGUE: ICD-10-CM

## 2023-10-09 DIAGNOSIS — K21.9 GASTROESOPHAGEAL REFLUX DISEASE WITHOUT ESOPHAGITIS: ICD-10-CM

## 2023-10-09 DIAGNOSIS — E10.65 TYPE 1 DIABETES MELLITUS WITH HYPERGLYCEMIA (HCC): ICD-10-CM

## 2023-10-09 DIAGNOSIS — R21 RASH: Primary | ICD-10-CM

## 2023-10-09 DIAGNOSIS — R06.00 DYSPNEA, UNSPECIFIED TYPE: ICD-10-CM

## 2023-10-09 DIAGNOSIS — N39.41 URGE INCONTINENCE: ICD-10-CM

## 2023-10-09 PROBLEM — N39.0 URINARY TRACT INFECTION WITHOUT HEMATURIA: Status: RESOLVED | Noted: 2022-07-19 | Resolved: 2023-10-09

## 2023-10-09 LAB
CHP ED QC CHECK: NORMAL
GLUCOSE BLD-MCNC: 254 MG/DL

## 2023-10-09 PROCEDURE — G8427 DOCREV CUR MEDS BY ELIG CLIN: HCPCS | Performed by: INTERNAL MEDICINE

## 2023-10-09 PROCEDURE — 1036F TOBACCO NON-USER: CPT | Performed by: INTERNAL MEDICINE

## 2023-10-09 PROCEDURE — 3052F HG A1C>EQUAL 8.0%<EQUAL 9.0%: CPT | Performed by: INTERNAL MEDICINE

## 2023-10-09 PROCEDURE — 82962 GLUCOSE BLOOD TEST: CPT | Performed by: INTERNAL MEDICINE

## 2023-10-09 PROCEDURE — 2022F DILAT RTA XM EVC RTNOPTHY: CPT | Performed by: INTERNAL MEDICINE

## 2023-10-09 PROCEDURE — G8420 CALC BMI NORM PARAMETERS: HCPCS | Performed by: INTERNAL MEDICINE

## 2023-10-09 PROCEDURE — G8484 FLU IMMUNIZE NO ADMIN: HCPCS | Performed by: INTERNAL MEDICINE

## 2023-10-09 PROCEDURE — 99214 OFFICE O/P EST MOD 30 MIN: CPT | Performed by: INTERNAL MEDICINE

## 2023-10-09 RX ORDER — PENTOXIFYLLINE 400 MG/1
400 TABLET, EXTENDED RELEASE ORAL
Qty: 90 TABLET | Refills: 3 | Status: SHIPPED | OUTPATIENT
Start: 2023-10-09 | End: 2024-01-07

## 2023-10-09 RX ORDER — OXYBUTYNIN CHLORIDE 10 MG/1
10 TABLET, EXTENDED RELEASE ORAL DAILY
Qty: 30 TABLET | Refills: 3 | Status: SHIPPED | OUTPATIENT
Start: 2023-10-09

## 2023-10-09 RX ORDER — BACLOFEN 10 MG/1
10 TABLET ORAL 2 TIMES DAILY PRN
COMMUNITY
Start: 2023-09-08

## 2023-10-09 RX ORDER — ALBUTEROL SULFATE 90 UG/1
2 AEROSOL, METERED RESPIRATORY (INHALATION) EVERY 6 HOURS PRN
Qty: 1 EACH | Refills: 3 | Status: SHIPPED | OUTPATIENT
Start: 2023-10-09

## 2023-10-09 RX ORDER — LEVOTHYROXINE SODIUM 175 UG/1
175 TABLET ORAL DAILY
Qty: 30 TABLET | Refills: 3 | Status: SHIPPED | OUTPATIENT
Start: 2023-10-09

## 2023-10-09 RX ORDER — FOLIC ACID 1 MG/1
1000 TABLET ORAL DAILY
COMMUNITY
Start: 2023-09-06

## 2023-10-09 NOTE — PROGRESS NOTES
ASSESSMENT/PLAN:    1. Painful tongue  Advised to continue Magic mouthwash as prescribed by ENT doctor as needed    2. Rash  Improved. 3. Hypothyroidism due to acquired atrophy of thyroid  Continue Synthroid    4. Gastroesophageal reflux disease without esophagitis  Continue Nexium    5. Urge incontinence  Patient on Ditropan XL    6. PAD (peripheral artery disease) ([address])  Patient on Trental    7. Migraine without status migrainosus, not intractable, unspecified migraine type  Continue Topamax and Tylenol as needed    8. Type 1 diabetes mellitus with hyperglycemia (HCC)  Advised to continue to follow with her endocrinologist.  Jonah Mendoza low sugar diet, exercise    - POCT Glucose        Care discussed with patient. Questions answered and patient verbalizes understanding and agrees with plan. Medications reviewed and reconciled. Continue current medications. Appropriate prescriptions are ordered. Risks and benefits of meds are discussed. After visit summary provided. Advised to call for any problems, questions, or concerns. If symptoms worsen or don't improve as expected, to call us or go to ER. Follow up as directed, sooner if needed. No follow-ups on file. This dictation was performed with a verbal recognition program and it was checked for errors. It is possible that there are still dictated errors within this office note. Any errors should be brought immediately to my attention for correction. All efforts were made to ensure that this office note is accurate.      Ar Renteria MD MD

## 2024-01-09 ENCOUNTER — OFFICE VISIT (OUTPATIENT)
Dept: INTERNAL MEDICINE CLINIC | Age: 49
End: 2024-01-09
Payer: COMMERCIAL

## 2024-01-09 VITALS
SYSTOLIC BLOOD PRESSURE: 113 MMHG | HEIGHT: 64 IN | BODY MASS INDEX: 20.83 KG/M2 | DIASTOLIC BLOOD PRESSURE: 73 MMHG | OXYGEN SATURATION: 96 % | WEIGHT: 122 LBS | HEART RATE: 82 BPM

## 2024-01-09 DIAGNOSIS — F99 PSYCHIATRIC PROBLEM: ICD-10-CM

## 2024-01-09 DIAGNOSIS — K14.6 PAINFUL TONGUE: ICD-10-CM

## 2024-01-09 DIAGNOSIS — J06.9 UPPER RESPIRATORY TRACT INFECTION, UNSPECIFIED TYPE: Primary | ICD-10-CM

## 2024-01-09 DIAGNOSIS — E10.65 TYPE 1 DIABETES MELLITUS WITH HYPERGLYCEMIA (HCC): ICD-10-CM

## 2024-01-09 DIAGNOSIS — Z98.84 HISTORY OF ROUX-EN-Y GASTRIC BYPASS: ICD-10-CM

## 2024-01-09 DIAGNOSIS — E10.21 DIABETIC NEPHROPATHY ASSOCIATED WITH TYPE 1 DIABETES MELLITUS (HCC): ICD-10-CM

## 2024-01-09 DIAGNOSIS — G43.909 MIGRAINE WITHOUT STATUS MIGRAINOSUS, NOT INTRACTABLE, UNSPECIFIED MIGRAINE TYPE: ICD-10-CM

## 2024-01-09 DIAGNOSIS — N39.41 URGE INCONTINENCE: ICD-10-CM

## 2024-01-09 DIAGNOSIS — E03.4 HYPOTHYROIDISM DUE TO ACQUIRED ATROPHY OF THYROID: ICD-10-CM

## 2024-01-09 DIAGNOSIS — K21.9 GASTROESOPHAGEAL REFLUX DISEASE WITHOUT ESOPHAGITIS: ICD-10-CM

## 2024-01-09 DIAGNOSIS — I73.9 PAD (PERIPHERAL ARTERY DISEASE) (HCC): ICD-10-CM

## 2024-01-09 DIAGNOSIS — R06.00 DYSPNEA, UNSPECIFIED TYPE: ICD-10-CM

## 2024-01-09 DIAGNOSIS — F31.9 BIPOLAR 1 DISORDER (HCC): ICD-10-CM

## 2024-01-09 DIAGNOSIS — Z12.31 ENCOUNTER FOR SCREENING MAMMOGRAM FOR MALIGNANT NEOPLASM OF BREAST: ICD-10-CM

## 2024-01-09 LAB
CHP ED QC CHECK: NORMAL
GLUCOSE BLD-MCNC: 115 MG/DL
HBA1C MFR BLD: 7.2 %

## 2024-01-09 PROCEDURE — G8420 CALC BMI NORM PARAMETERS: HCPCS | Performed by: INTERNAL MEDICINE

## 2024-01-09 PROCEDURE — 83036 HEMOGLOBIN GLYCOSYLATED A1C: CPT | Performed by: INTERNAL MEDICINE

## 2024-01-09 PROCEDURE — G8484 FLU IMMUNIZE NO ADMIN: HCPCS | Performed by: INTERNAL MEDICINE

## 2024-01-09 PROCEDURE — 99214 OFFICE O/P EST MOD 30 MIN: CPT | Performed by: INTERNAL MEDICINE

## 2024-01-09 PROCEDURE — G8427 DOCREV CUR MEDS BY ELIG CLIN: HCPCS | Performed by: INTERNAL MEDICINE

## 2024-01-09 PROCEDURE — 3051F HG A1C>EQUAL 7.0%<8.0%: CPT | Performed by: INTERNAL MEDICINE

## 2024-01-09 PROCEDURE — 2022F DILAT RTA XM EVC RTNOPTHY: CPT | Performed by: INTERNAL MEDICINE

## 2024-01-09 PROCEDURE — 82962 GLUCOSE BLOOD TEST: CPT | Performed by: INTERNAL MEDICINE

## 2024-01-09 PROCEDURE — 1036F TOBACCO NON-USER: CPT | Performed by: INTERNAL MEDICINE

## 2024-01-09 RX ORDER — LEVOTHYROXINE SODIUM 175 UG/1
175 TABLET ORAL DAILY
Qty: 30 TABLET | Refills: 3 | Status: SHIPPED | OUTPATIENT
Start: 2024-01-09

## 2024-01-09 RX ORDER — OXYBUTYNIN CHLORIDE 10 MG/1
10 TABLET, EXTENDED RELEASE ORAL DAILY
Qty: 30 TABLET | Refills: 3 | Status: SHIPPED | OUTPATIENT
Start: 2024-01-09

## 2024-01-09 RX ORDER — AZITHROMYCIN 250 MG/1
250 TABLET, FILM COATED ORAL SEE ADMIN INSTRUCTIONS
Qty: 6 TABLET | Refills: 0 | Status: SHIPPED | OUTPATIENT
Start: 2024-01-09 | End: 2024-01-14

## 2024-01-09 RX ORDER — ALBUTEROL SULFATE 90 UG/1
2 AEROSOL, METERED RESPIRATORY (INHALATION) EVERY 6 HOURS PRN
Qty: 1 EACH | Refills: 3 | Status: SHIPPED | OUTPATIENT
Start: 2024-01-09

## 2024-01-09 ASSESSMENT — PATIENT HEALTH QUESTIONNAIRE - PHQ9
5. POOR APPETITE OR OVEREATING: 0
SUM OF ALL RESPONSES TO PHQ QUESTIONS 1-9: 0
10. IF YOU CHECKED OFF ANY PROBLEMS, HOW DIFFICULT HAVE THESE PROBLEMS MADE IT FOR YOU TO DO YOUR WORK, TAKE CARE OF THINGS AT HOME, OR GET ALONG WITH OTHER PEOPLE: 0
4. FEELING TIRED OR HAVING LITTLE ENERGY: 0
6. FEELING BAD ABOUT YOURSELF - OR THAT YOU ARE A FAILURE OR HAVE LET YOURSELF OR YOUR FAMILY DOWN: 0
2. FEELING DOWN, DEPRESSED OR HOPELESS: 0
7. TROUBLE CONCENTRATING ON THINGS, SUCH AS READING THE NEWSPAPER OR WATCHING TELEVISION: 0
1. LITTLE INTEREST OR PLEASURE IN DOING THINGS: 0
SUM OF ALL RESPONSES TO PHQ QUESTIONS 1-9: 0
9. THOUGHTS THAT YOU WOULD BE BETTER OFF DEAD, OR OF HURTING YOURSELF: 0
8. MOVING OR SPEAKING SO SLOWLY THAT OTHER PEOPLE COULD HAVE NOTICED. OR THE OPPOSITE, BEING SO FIGETY OR RESTLESS THAT YOU HAVE BEEN MOVING AROUND A LOT MORE THAN USUAL: 0
SUM OF ALL RESPONSES TO PHQ QUESTIONS 1-9: 0
3. TROUBLE FALLING OR STAYING ASLEEP: 0
SUM OF ALL RESPONSES TO PHQ9 QUESTIONS 1 & 2: 0
SUM OF ALL RESPONSES TO PHQ QUESTIONS 1-9: 0

## 2024-01-09 NOTE — PROGRESS NOTES
Name: Zaira Joy  [id number]  Age: 48 y.o.  YOB: 1975  Sex: female    CHIEF COMPLAINT:    Chief Complaint   Patient presents with    Gastroesophageal Reflux    Diabetes    Other     Other chronic conditions         HISTORY OF PRESENT ILLNESS:     This is a pleasant  48 y.o. female  is seen today for management of chronic medical problems and medications refills.  Previous records reviewed .      Patient has multiple medical problems including hypothyroid, diabetes type 1, hyperlipidemia, insomnia, anxiety, bipolar disorder, GERD, migraine headaches, allergic rhinitis , urinary incontinence etc.           She claims she is being followed by endocrinologist in [place], Dr. Noguera for her diabetes.  She sees Dr. Sanchez for proteinuria but avoiding ACE or ARB's because of  Hypotension.  She also sees a psychiatrist , Dr. Garcia for her bipolar disorder and anxiety / depression.       Patient developed spots on her tongue several weeks ago  and some pain and was treated with nystatin swish and swallow but did not help her.  She went to ENT and they gave her magic mouth wash and this did not help her either.  Dr. Chatman advised to take Folic acid and this did  not help her either.  ENT NP  thought that it is reaction to  some medication and advised to get biopsy if not better.  Patient claims that her soreness of her tongue is very mild and she is used to it now and does not want biopsy at this time.    Recently C/O sore throat and cough.. her father also has similar symptoms recently.   No fever  or chills.    Doing OK otherwise  Denies CP or SOB.    Denies any abdominal pain.  Occasional nausea. Takes Zofran PRN     Had UGI and SBFT  several  months ago ordered by Dr. Hanson and it was apparently ok.  Dr. Hanson is going to do a colonoscopy soon.     Appetite OK.  Bowels moving Ok.  Urge incontinence is better . No recent urinary tract infection  Denies any significant arthritis.  Hearing is

## 2024-01-16 ENCOUNTER — HOSPITAL ENCOUNTER (OUTPATIENT)
Age: 49
Discharge: HOME | End: 2024-01-16
Payer: COMMERCIAL

## 2024-01-16 DIAGNOSIS — E03.4 HYPOTHYROIDISM DUE TO ACQUIRED ATROPHY OF THYROID: ICD-10-CM

## 2024-01-16 DIAGNOSIS — K21.9 GASTROESOPHAGEAL REFLUX DISEASE WITHOUT ESOPHAGITIS: ICD-10-CM

## 2024-01-16 DIAGNOSIS — E10.65 TYPE 1 DIABETES MELLITUS WITH HYPERGLYCEMIA (HCC): ICD-10-CM

## 2024-01-16 LAB
ALBUMIN SERPL-MCNC: 3.4 GM/DL (ref 3.4–5)
ALP BLD-CCNC: 117 IU/L (ref 40–128)
ALT SERPL-CCNC: 18 U/L (ref 10–40)
ANION GAP SERPL CALCULATED.3IONS-SCNC: 11 MMOL/L (ref 7–16)
AST SERPL-CCNC: 15 IU/L (ref 15–37)
BASOPHILS ABSOLUTE: 0 K/CU MM
BASOPHILS RELATIVE PERCENT: 0.5 % (ref 0–1)
BILIRUB SERPL-MCNC: 0.2 MG/DL (ref 0–1)
BUN SERPL-MCNC: 13 MG/DL (ref 6–23)
CALCIUM SERPL-MCNC: 8.1 MG/DL (ref 8.3–10.6)
CHLORIDE BLD-SCNC: 105 MMOL/L (ref 99–110)
CO2: 22 MMOL/L (ref 21–32)
CREAT SERPL-MCNC: 0.5 MG/DL (ref 0.6–1.1)
DIFFERENTIAL TYPE: ABNORMAL
EOSINOPHILS ABSOLUTE: 0.1 K/CU MM
EOSINOPHILS RELATIVE PERCENT: 1.8 % (ref 0–3)
ESTIMATED AVERAGE GLUCOSE: 166 MG/DL
GFR SERPL CREATININE-BSD FRML MDRD: >60 ML/MIN/1.73M2
GLUCOSE SERPL-MCNC: 208 MG/DL (ref 70–99)
HBA1C MFR BLD: 7.4 % (ref 4.2–6.3)
HCT VFR BLD CALC: 29 % (ref 37–47)
HEMOGLOBIN: 9 GM/DL (ref 12.5–16)
IMMATURE NEUTROPHIL %: 0.2 % (ref 0–0.43)
LYMPHOCYTES ABSOLUTE: 2.8 K/CU MM
LYMPHOCYTES RELATIVE PERCENT: 51.8 % (ref 24–44)
MCH RBC QN AUTO: 25.9 PG (ref 27–31)
MCHC RBC AUTO-ENTMCNC: 31 % (ref 32–36)
MCV RBC AUTO: 83.6 FL (ref 78–100)
MONOCYTES ABSOLUTE: 0.3 K/CU MM
MONOCYTES RELATIVE PERCENT: 4.9 % (ref 0–4)
NUCLEATED RBC %: 0 %
PDW BLD-RTO: 13.4 % (ref 11.7–14.9)
PLATELET # BLD: 292 K/CU MM (ref 140–440)
PMV BLD AUTO: 10.4 FL (ref 7.5–11.1)
POTASSIUM SERPL-SCNC: 4.5 MMOL/L (ref 3.5–5.1)
RBC # BLD: 3.47 M/CU MM (ref 4.2–5.4)
SEGMENTED NEUTROPHILS ABSOLUTE COUNT: 2.2 K/CU MM
SEGMENTED NEUTROPHILS RELATIVE PERCENT: 40.8 % (ref 36–66)
SODIUM BLD-SCNC: 138 MMOL/L (ref 135–145)
TOTAL IMMATURE NEUTOROPHIL: 0.01 K/CU MM
TOTAL NUCLEATED RBC: 0 K/CU MM
TOTAL PROTEIN: 5.6 GM/DL (ref 6.4–8.2)
TSH SERPL DL<=0.005 MIU/L-ACNC: 0.74 UIU/ML (ref 0.27–4.2)
WBC # BLD: 5.5 K/CU MM (ref 4–10.5)

## 2024-01-16 PROCEDURE — 85025 COMPLETE CBC W/AUTO DIFF WBC: CPT

## 2024-01-16 PROCEDURE — 83036 HEMOGLOBIN GLYCOSYLATED A1C: CPT

## 2024-01-16 PROCEDURE — 36415 COLL VENOUS BLD VENIPUNCTURE: CPT

## 2024-01-16 PROCEDURE — 80053 COMPREHEN METABOLIC PANEL: CPT

## 2024-01-16 PROCEDURE — 84443 ASSAY THYROID STIM HORMONE: CPT

## 2024-01-17 ENCOUNTER — TELEPHONE (OUTPATIENT)
Dept: INTERNAL MEDICINE CLINIC | Age: 49
End: 2024-01-17

## 2024-01-17 DIAGNOSIS — D64.9 ANEMIA, UNSPECIFIED TYPE: Primary | ICD-10-CM

## 2024-01-17 DIAGNOSIS — R21 RASH: Primary | ICD-10-CM

## 2024-01-17 RX ORDER — FERROUS SULFATE 325(65) MG
325 TABLET ORAL 2 TIMES DAILY
Qty: 180 TABLET | Refills: 1 | Status: SHIPPED | OUTPATIENT
Start: 2024-01-17

## 2024-01-17 NOTE — TELEPHONE ENCOUNTER
Please place referral to dermatology, they have been going for years but the dermatology office Is asking for an updated one. Pt goes to [facility]

## 2024-02-13 ENCOUNTER — HOSPITAL ENCOUNTER (OUTPATIENT)
Dept: WOMENS IMAGING | Age: 49
Discharge: HOME | End: 2024-02-13
Payer: COMMERCIAL

## 2024-02-13 VITALS — BODY MASS INDEX: 19.97 KG/M2 | WEIGHT: 117 LBS | HEIGHT: 64 IN

## 2024-02-13 DIAGNOSIS — Z12.31 ENCOUNTER FOR SCREENING MAMMOGRAM FOR MALIGNANT NEOPLASM OF BREAST: ICD-10-CM

## 2024-02-13 PROCEDURE — 77063 BREAST TOMOSYNTHESIS BI: CPT

## 2024-02-15 DIAGNOSIS — R92.8 ABNORMAL MAMMOGRAM: Primary | ICD-10-CM

## 2024-02-16 ENCOUNTER — TELEPHONE (OUTPATIENT)
Dept: INTERNAL MEDICINE CLINIC | Age: 49
End: 2024-02-16

## 2024-02-16 DIAGNOSIS — R92.8 ABNORMAL MAMMOGRAM OF LEFT BREAST: Primary | ICD-10-CM

## 2024-02-16 NOTE — TELEPHONE ENCOUNTER
Placed order, and called left a informed VM at [facility] that's orders are in epic and to call with any questions

## 2024-02-16 NOTE — TELEPHONE ENCOUNTER
The Mammography Dept called stating that the orders were put in wrong for this patient. The orders need to be separate. So they need a diagnostic mammogram and ultrasound separately.

## 2024-02-23 ENCOUNTER — HOSPITAL ENCOUNTER (OUTPATIENT)
Dept: INFUSION THERAPY | Age: 49
Discharge: HOME | End: 2024-02-23
Payer: COMMERCIAL

## 2024-02-23 ENCOUNTER — INITIAL CONSULT (OUTPATIENT)
Dept: ONCOLOGY | Age: 49
End: 2024-02-23
Payer: COMMERCIAL

## 2024-02-23 VITALS
DIASTOLIC BLOOD PRESSURE: 53 MMHG | BODY MASS INDEX: 21.1 KG/M2 | HEIGHT: 64 IN | SYSTOLIC BLOOD PRESSURE: 112 MMHG | HEART RATE: 76 BPM | WEIGHT: 123.6 LBS | OXYGEN SATURATION: 98 % | TEMPERATURE: 97.3 F

## 2024-02-23 DIAGNOSIS — D64.9 ANEMIA, UNSPECIFIED TYPE: ICD-10-CM

## 2024-02-23 DIAGNOSIS — K91.89 IRON DEFICIENCY ANEMIA AFTER GASTRECTOMY: ICD-10-CM

## 2024-02-23 DIAGNOSIS — D64.9 ANEMIA, UNSPECIFIED TYPE: Primary | ICD-10-CM

## 2024-02-23 DIAGNOSIS — D50.8 IRON DEFICIENCY ANEMIA AFTER GASTRECTOMY: ICD-10-CM

## 2024-02-23 LAB
BASOPHILS ABSOLUTE: 0.1 K/CU MM
BASOPHILS RELATIVE PERCENT: 0.7 % (ref 0–1)
DIFFERENTIAL TYPE: ABNORMAL
EOSINOPHILS ABSOLUTE: 0.1 K/CU MM
EOSINOPHILS RELATIVE PERCENT: 1.5 % (ref 0–3)
FERRITIN: 7 NG/ML (ref 15–150)
FOLATE SERPL-MCNC: >20 NG/ML (ref 3.1–17.5)
HCT VFR BLD CALC: 29.6 % (ref 37–47)
HEMOGLOBIN: 9.1 GM/DL (ref 12.5–16)
IRON: 16 UG/DL (ref 37–145)
LYMPHOCYTES ABSOLUTE: 2.8 K/CU MM
LYMPHOCYTES RELATIVE PERCENT: 37 % (ref 24–44)
MCH RBC QN AUTO: 25.2 PG (ref 27–31)
MCHC RBC AUTO-ENTMCNC: 30.7 % (ref 32–36)
MCV RBC AUTO: 82 FL (ref 78–100)
MONOCYTES ABSOLUTE: 0.4 K/CU MM
MONOCYTES RELATIVE PERCENT: 5.4 % (ref 0–4)
PCT TRANSFERRIN: 4 % (ref 10–44)
PDW BLD-RTO: 14.6 % (ref 11.7–14.9)
PLATELET # BLD: 364 K/CU MM (ref 140–440)
PMV BLD AUTO: 8.8 FL (ref 7.5–11.1)
RBC # BLD: 3.61 M/CU MM (ref 4.2–5.4)
SEGMENTED NEUTROPHILS ABSOLUTE COUNT: 4.1 K/CU MM
SEGMENTED NEUTROPHILS RELATIVE PERCENT: 55.4 % (ref 36–66)
TOTAL IRON BINDING CAPACITY: 390 UG/DL (ref 250–450)
TOTAL PROTEIN: 6 GM/DL (ref 6.4–8.2)
UNSATURATED IRON BINDING CAPACITY: 374 UG/DL (ref 110–370)
VITAMIN B-12: 1673 PG/ML (ref 211–911)
WBC # BLD: 7.4 K/CU MM (ref 4–10.5)

## 2024-02-23 PROCEDURE — 85025 COMPLETE CBC W/AUTO DIFF WBC: CPT

## 2024-02-23 PROCEDURE — 82746 ASSAY OF FOLIC ACID SERUM: CPT

## 2024-02-23 PROCEDURE — 1036F TOBACCO NON-USER: CPT | Performed by: INTERNAL MEDICINE

## 2024-02-23 PROCEDURE — 82728 ASSAY OF FERRITIN: CPT

## 2024-02-23 PROCEDURE — 84630 ASSAY OF ZINC: CPT

## 2024-02-23 PROCEDURE — 82525 ASSAY OF COPPER: CPT

## 2024-02-23 PROCEDURE — 82607 VITAMIN B-12: CPT

## 2024-02-23 PROCEDURE — G8427 DOCREV CUR MEDS BY ELIG CLIN: HCPCS | Performed by: INTERNAL MEDICINE

## 2024-02-23 PROCEDURE — 84165 PROTEIN E-PHORESIS SERUM: CPT

## 2024-02-23 PROCEDURE — G8420 CALC BMI NORM PARAMETERS: HCPCS | Performed by: INTERNAL MEDICINE

## 2024-02-23 PROCEDURE — 99211 OFF/OP EST MAY X REQ PHY/QHP: CPT

## 2024-02-23 PROCEDURE — G8484 FLU IMMUNIZE NO ADMIN: HCPCS | Performed by: INTERNAL MEDICINE

## 2024-02-23 PROCEDURE — 36415 COLL VENOUS BLD VENIPUNCTURE: CPT

## 2024-02-23 PROCEDURE — 99204 OFFICE O/P NEW MOD 45 MIN: CPT | Performed by: INTERNAL MEDICINE

## 2024-02-23 PROCEDURE — 83540 ASSAY OF IRON: CPT

## 2024-02-23 PROCEDURE — 83550 IRON BINDING TEST: CPT

## 2024-02-23 PROCEDURE — 84155 ASSAY OF PROTEIN SERUM: CPT

## 2024-02-23 RX ORDER — KETOCONAZOLE 20 MG/ML
SHAMPOO TOPICAL
COMMUNITY

## 2024-02-23 NOTE — PROGRESS NOTES
MA Rooming Questions  Patient: Zaira Joy  MRN: [id number]    Date: 2/23/2024        New Patient     5. Did the patient have a depression screening completed today? No    No data recorded     PHQ-9 Given to (if applicable):               PHQ-9 Score (if applicable):                     [] Positive     []  Negative              Does question #9 need addressed (if applicable)                     [] Yes    []  No               Nettie Sevilla MA      
headache, No hiccups, No abnormal gait, No sensory changes, No weakness.  Psychiatric:  No depression, No anxiety, Concentration normal.  Endocrine:  No polyuria, No polydipsia, No hot flashes, No thyroid symptoms.  Hematologic:  No epistaxis, No gingival bleeding, No petechiae, No ecchymosis.  Lymphatic:  No lymphadenopathy, No lymphedema.  Allergy / Immunologic:  No eczema, No frequent mucous infections, No frequent respiratory infections, No recurrent urticarial, No frequent skin infections.     Vital Signs: There were no vitals taken for this visit.     Physical Exam:  CONSTITUTIONAL: awake, alert, cooperative, no apparent distress   EYES: pupils equal, round and reactive to light, sclera clear and + pallor  ENT: Normocephalic, without obvious abnormality, atraumatic  NECK: supple, symmetrical, no jugular venous distension and no carotid bruits   HEMATOLOGIC/LYMPHATIC: no cervical, supraclavicular or axillary lymphadenopathy   LUNGS: no increased work of breathing and clear to auscultation   CARDIOVASCULAR: regular rate and rhythm, normal S1 and S2, no murmur noted  ABDOMEN: normal bowel sounds x 4, soft, non-distended, non-tender, no masses palpated, no hepatosplenomegaly   MUSCULOSKELETAL: full range of motion noted, tone is normal  NEUROLOGIC: awake, alert, oriented to name, place and time. Motor skills grossly intact.   SKIN: Normal skin color, texture, turgor and no jaundice. appears intact   EXTREMITIES: no LE edema     Labs:  Hematology:  Lab Results   Component Value Date    WBC 5.5 01/16/2024    RBC 3.47 (L) 01/16/2024    HGB 9.0 (L) 01/16/2024    HCT 29.0 (L) 01/16/2024    MCV 83.6 01/16/2024    MCH 25.9 (L) 01/16/2024    MCHC 31.0 (L) 01/16/2024    RDW 13.4 01/16/2024     01/16/2024    MPV 10.4 01/16/2024    SEGSPCT 40.8 01/16/2024    EOSRELPCT 1.8 01/16/2024    BASOPCT 0.5 01/16/2024    LYMPHOPCT 51.8 (H) 01/16/2024    MONOPCT 4.9 (H) 01/16/2024    SEGSABS 2.2 01/16/2024    EOSABS 0.1

## 2024-02-25 PROBLEM — K91.89 IRON DEFICIENCY ANEMIA AFTER GASTRECTOMY: Status: ACTIVE | Noted: 2024-02-25

## 2024-02-25 PROBLEM — D50.8 IRON DEFICIENCY ANEMIA AFTER GASTRECTOMY: Status: ACTIVE | Noted: 2024-02-25

## 2024-02-26 ENCOUNTER — CLINICAL DOCUMENTATION (OUTPATIENT)
Dept: ONCOLOGY | Age: 49
End: 2024-02-26

## 2024-02-26 LAB
COPPER SERPL-MCNC: 85.8 UG/DL (ref 80–155)
ZINC SERPL-MCNC: 40.8 UG/DL (ref 60–120)

## 2024-02-26 NOTE — PROGRESS NOTES
This nurse called the patient @ [phone] to review lab results and to advise the patient that Dr Flores would like to offer her an iron infusion as well to cover the potential for a reaction to the infusion ( such as local irritation, rash, nausea, vomiting, diarrhea and myalgias). However there was no answer. This nurse left a VM for the patient requesting a call back, this RN's direct number left.

## 2024-02-27 ENCOUNTER — HOSPITAL ENCOUNTER (OUTPATIENT)
Dept: ULTRASOUND IMAGING | Age: 49
Discharge: HOME | End: 2024-02-27
Payer: COMMERCIAL

## 2024-02-27 DIAGNOSIS — D50.0 IRON DEFICIENCY ANEMIA SECONDARY TO BLOOD LOSS (CHRONIC): Primary | ICD-10-CM

## 2024-02-27 DIAGNOSIS — R92.8 ABNORMAL MAMMOGRAM: ICD-10-CM

## 2024-02-27 PROCEDURE — 76642 ULTRASOUND BREAST LIMITED: CPT

## 2024-02-27 RX ORDER — ACETAMINOPHEN 325 MG/1
650 TABLET ORAL
OUTPATIENT
Start: 2024-02-27

## 2024-02-27 RX ORDER — ONDANSETRON 2 MG/ML
8 INJECTION INTRAMUSCULAR; INTRAVENOUS
OUTPATIENT
Start: 2024-02-27

## 2024-02-27 RX ORDER — EPINEPHRINE 1 MG/ML
0.3 INJECTION, SOLUTION, CONCENTRATE INTRAVENOUS PRN
OUTPATIENT
Start: 2024-02-27

## 2024-02-27 RX ORDER — ALBUTEROL SULFATE 90 UG/1
4 AEROSOL, METERED RESPIRATORY (INHALATION) PRN
OUTPATIENT
Start: 2024-02-27

## 2024-02-27 RX ORDER — SODIUM CHLORIDE 9 MG/ML
5-250 INJECTION, SOLUTION INTRAVENOUS PRN
OUTPATIENT
Start: 2024-02-27

## 2024-02-27 RX ORDER — SODIUM CHLORIDE 0.9 % (FLUSH) 0.9 %
5-40 SYRINGE (ML) INJECTION PRN
OUTPATIENT
Start: 2024-02-27

## 2024-02-27 RX ORDER — FAMOTIDINE 10 MG/ML
20 INJECTION, SOLUTION INTRAVENOUS
OUTPATIENT
Start: 2024-02-27

## 2024-02-27 RX ORDER — DIPHENHYDRAMINE HYDROCHLORIDE 50 MG/ML
50 INJECTION INTRAMUSCULAR; INTRAVENOUS
OUTPATIENT
Start: 2024-02-27

## 2024-02-27 RX ORDER — HEPARIN SODIUM (PORCINE) LOCK FLUSH IV SOLN 100 UNIT/ML 100 UNIT/ML
500 SOLUTION INTRAVENOUS PRN
OUTPATIENT
Start: 2024-02-27

## 2024-02-27 RX ORDER — SODIUM CHLORIDE 9 MG/ML
INJECTION, SOLUTION INTRAVENOUS CONTINUOUS
OUTPATIENT
Start: 2024-02-27

## 2024-02-27 NOTE — PROGRESS NOTES
Patient needs iron infusion for iron deficiency anemia d/t chronic blood loss. Order for Injectafer and NN referral placed. Therapy plan added. Called patient at [phone] to notify. Explained that once medication approved by insurance, patient will be scheduled for OP infusion and notified of appointment time. Patient advised of potential for an allergic reaction. Patient voices understanding. No further needs addressed at this time.

## 2024-02-28 ENCOUNTER — CLINICAL DOCUMENTATION (OUTPATIENT)
Dept: ONCOLOGY | Age: 49
End: 2024-02-28

## 2024-02-28 DIAGNOSIS — K91.89 IRON DEFICIENCY ANEMIA AFTER GASTRECTOMY: Primary | ICD-10-CM

## 2024-02-28 DIAGNOSIS — D50.8 IRON DEFICIENCY ANEMIA AFTER GASTRECTOMY: Primary | ICD-10-CM

## 2024-03-01 ENCOUNTER — CLINICAL DOCUMENTATION (OUTPATIENT)
Dept: ONCOLOGY | Age: 49
End: 2024-03-01

## 2024-03-01 LAB
ALBUMIN SERPL ELPH-MCNC: 3.3 GM/DL (ref 3.2–5.6)
ALPHA-1-GLOBULIN: 0.2 GM/DL (ref 0.1–0.4)
ALPHA-2-GLOBULIN: 0.6 GM/DL (ref 0.4–1.2)
BETA GLOBULIN: 0.9 GM/DL (ref 0.5–1.3)
GAMMA GLOBULIN: 1.1 GM/DL (ref 0.5–1.6)
TOTAL PROTEIN: 6 GM/DL (ref 6.4–8.2)

## 2024-03-01 NOTE — PROGRESS NOTES
This nurse called the patient @ [phone] to review lab results. However there was no answer. This nurse left a VM for the patient advising of her Zinc being low and the need to take Zinc 50 mg daily per physician recommendation. This nurse also advised of the potential interaction due to her listed Alimentum allergy, this RN's direct number left.

## 2024-03-06 LAB
ALBUMIN SERPL ELPH-MCNC: 3.3 GM/DL (ref 3.2–5.6)
ALPHA-1-GLOBULIN: 0.2 GM/DL (ref 0.1–0.4)
ALPHA-2-GLOBULIN: 0.6 GM/DL (ref 0.4–1.2)
BETA GLOBULIN: 0.9 GM/DL (ref 0.5–1.3)
GAMMA GLOBULIN: 1.1 GM/DL (ref 0.5–1.6)
SPEP INTERPRETATION: ABNORMAL
TOTAL PROTEIN: 6 GM/DL (ref 6.4–8.2)

## 2024-03-06 RX ORDER — DIPHENHYDRAMINE HYDROCHLORIDE 50 MG/ML
50 INJECTION INTRAMUSCULAR; INTRAVENOUS
Status: CANCELLED | OUTPATIENT
Start: 2024-03-06

## 2024-03-06 RX ORDER — ACETAMINOPHEN 325 MG/1
650 TABLET ORAL
Status: CANCELLED | OUTPATIENT
Start: 2024-03-06

## 2024-03-06 RX ORDER — EPINEPHRINE 1 MG/ML
0.3 INJECTION, SOLUTION, CONCENTRATE INTRAVENOUS PRN
Status: CANCELLED | OUTPATIENT
Start: 2024-03-06

## 2024-03-06 RX ORDER — ONDANSETRON 2 MG/ML
8 INJECTION INTRAMUSCULAR; INTRAVENOUS
Status: CANCELLED | OUTPATIENT
Start: 2024-03-06

## 2024-03-06 RX ORDER — SODIUM CHLORIDE 9 MG/ML
INJECTION, SOLUTION INTRAVENOUS CONTINUOUS
Status: CANCELLED | OUTPATIENT
Start: 2024-03-06

## 2024-03-06 RX ORDER — ALBUTEROL SULFATE 90 UG/1
4 AEROSOL, METERED RESPIRATORY (INHALATION) PRN
Status: CANCELLED | OUTPATIENT
Start: 2024-03-06

## 2024-03-06 RX ORDER — HEPARIN SODIUM (PORCINE) LOCK FLUSH IV SOLN 100 UNIT/ML 100 UNIT/ML
500 SOLUTION INTRAVENOUS PRN
Status: CANCELLED | OUTPATIENT
Start: 2024-03-06

## 2024-03-06 RX ORDER — SODIUM CHLORIDE 9 MG/ML
5-250 INJECTION, SOLUTION INTRAVENOUS PRN
Status: CANCELLED | OUTPATIENT
Start: 2024-03-06

## 2024-03-06 RX ORDER — SODIUM CHLORIDE 0.9 % (FLUSH) 0.9 %
5-40 SYRINGE (ML) INJECTION PRN
Status: CANCELLED | OUTPATIENT
Start: 2024-03-06

## 2024-03-06 RX ORDER — FAMOTIDINE 10 MG/ML
20 INJECTION, SOLUTION INTRAVENOUS
Status: CANCELLED | OUTPATIENT
Start: 2024-03-06

## 2024-03-07 ENCOUNTER — OFFICE VISIT (OUTPATIENT)
Dept: NEUROLOGY | Age: 49
End: 2024-03-07
Payer: COMMERCIAL

## 2024-03-07 VITALS
BODY MASS INDEX: 20.66 KG/M2 | DIASTOLIC BLOOD PRESSURE: 62 MMHG | HEART RATE: 70 BPM | WEIGHT: 121 LBS | OXYGEN SATURATION: 98 % | SYSTOLIC BLOOD PRESSURE: 124 MMHG | HEIGHT: 64 IN

## 2024-03-07 DIAGNOSIS — G43.009 MIGRAINE WITHOUT AURA, NOT INTRACTABLE, WITHOUT STATUS MIGRAINOSUS: Primary | ICD-10-CM

## 2024-03-07 PROCEDURE — G8427 DOCREV CUR MEDS BY ELIG CLIN: HCPCS | Performed by: PSYCHIATRY & NEUROLOGY

## 2024-03-07 PROCEDURE — 1036F TOBACCO NON-USER: CPT | Performed by: PSYCHIATRY & NEUROLOGY

## 2024-03-07 PROCEDURE — G8484 FLU IMMUNIZE NO ADMIN: HCPCS | Performed by: PSYCHIATRY & NEUROLOGY

## 2024-03-07 PROCEDURE — G8420 CALC BMI NORM PARAMETERS: HCPCS | Performed by: PSYCHIATRY & NEUROLOGY

## 2024-03-07 PROCEDURE — 99204 OFFICE O/P NEW MOD 45 MIN: CPT | Performed by: PSYCHIATRY & NEUROLOGY

## 2024-03-07 RX ORDER — DIPHENHYDRAMINE HYDROCHLORIDE 50 MG/ML
50 INJECTION INTRAMUSCULAR; INTRAVENOUS
OUTPATIENT
Start: 2024-03-07

## 2024-03-07 RX ORDER — SODIUM CHLORIDE 0.9 % (FLUSH) 0.9 %
5-40 SYRINGE (ML) INJECTION PRN
OUTPATIENT
Start: 2024-03-07

## 2024-03-07 RX ORDER — FAMOTIDINE 10 MG/ML
20 INJECTION, SOLUTION INTRAVENOUS
OUTPATIENT
Start: 2024-03-07

## 2024-03-07 RX ORDER — EPINEPHRINE 1 MG/ML
0.3 INJECTION, SOLUTION, CONCENTRATE INTRAVENOUS PRN
OUTPATIENT
Start: 2024-03-07

## 2024-03-07 RX ORDER — ALBUTEROL SULFATE 90 UG/1
4 AEROSOL, METERED RESPIRATORY (INHALATION) PRN
OUTPATIENT
Start: 2024-03-07

## 2024-03-07 RX ORDER — ACETAMINOPHEN 325 MG/1
650 TABLET ORAL
OUTPATIENT
Start: 2024-03-07

## 2024-03-07 RX ORDER — ONDANSETRON 2 MG/ML
8 INJECTION INTRAMUSCULAR; INTRAVENOUS
OUTPATIENT
Start: 2024-03-07

## 2024-03-07 RX ORDER — SODIUM CHLORIDE 9 MG/ML
5-250 INJECTION, SOLUTION INTRAVENOUS PRN
OUTPATIENT
Start: 2024-03-07

## 2024-03-07 RX ORDER — SODIUM CHLORIDE 9 MG/ML
INJECTION, SOLUTION INTRAVENOUS CONTINUOUS
OUTPATIENT
Start: 2024-03-07

## 2024-03-07 RX ORDER — HEPARIN SODIUM (PORCINE) LOCK FLUSH IV SOLN 100 UNIT/ML 100 UNIT/ML
500 SOLUTION INTRAVENOUS PRN
OUTPATIENT
Start: 2024-03-07

## 2024-03-07 NOTE — PROGRESS NOTES
3/7/24    Zaira Joy  1975    Chief Complaint   Patient presents with    New Patient     Patient here to establish care for migraine.        History of Present Illness  Zaira is a 49 y.o. female presenting today for evaluation of: Migraine.  She reports she previously seen Dr. Linn in [place] and has been well-controlled on Topamax for several years.  As she no longer drives, she needed to establish care with a neurologist closer to home so her father is able to drive her back and forth to appointments.    Zaira reports that she initially developed migraine headache following an endometrial ablation in 2017.  Previously, she would have headaches, requiring her to lie down in a quiet, dark room for up to a week.  During these episodes, she was quite light and sound sensitive, and it appeared that nothing was helping her headaches.  She had initially undergone trial of Imitrex for rescue therapy.  However, she says these were ineffective and further contributed to headache pain.  Following discussion with her PCP, she stopped the medication, as her PCP had told her, \"you are taking so much of this you are going to give yourself a stroke.\"  She was then started on Topamax and was taking 600 mg daily (200 mg 3 times daily).  She says that not only did this dose make her more tired, in addition to all the other medication she was taking, she also developed tardive type movements.  Since weaning Topamax, these movements have largely stopped.  Though, she does note is a small tremoring of her lips every now and again.  She is currently taking Topamax 200 mg at night and feels her headaches are well-controlled at this dose.  She currently endorses 4 headache days per month and says she has not had a migraine since 2018.  She does not take any medication when she has a headache.  She will lie down and nap for a few hours, and she says this resolves her headache.  She does not wish to make any changes to her

## 2024-03-14 ENCOUNTER — TELEPHONE (OUTPATIENT)
Dept: INFUSION THERAPY | Age: 49
End: 2024-03-14

## 2024-03-15 NOTE — TELEPHONE ENCOUNTER
Called patient to schedule iron infusion, patient is scheduled for appt. Advised pt to notify us if they are unable to keep their scheduled appt time.

## 2024-03-18 ENCOUNTER — HOSPITAL ENCOUNTER (OUTPATIENT)
Dept: INFUSION THERAPY | Age: 49
Discharge: HOME | End: 2024-03-18
Payer: COMMERCIAL

## 2024-03-18 VITALS
TEMPERATURE: 98.1 F | OXYGEN SATURATION: 93 % | DIASTOLIC BLOOD PRESSURE: 67 MMHG | HEART RATE: 85 BPM | SYSTOLIC BLOOD PRESSURE: 146 MMHG | RESPIRATION RATE: 20 BRPM

## 2024-03-18 DIAGNOSIS — D50.0 IRON DEFICIENCY ANEMIA SECONDARY TO BLOOD LOSS (CHRONIC): Primary | ICD-10-CM

## 2024-03-18 DIAGNOSIS — K91.2 POSTSURGICAL NONABSORPTION: ICD-10-CM

## 2024-03-18 PROCEDURE — 2580000003 HC RX 258: Performed by: INTERNAL MEDICINE

## 2024-03-18 PROCEDURE — 96366 THER/PROPH/DIAG IV INF ADDON: CPT

## 2024-03-18 PROCEDURE — 6360000002 HC RX W HCPCS: Performed by: INTERNAL MEDICINE

## 2024-03-18 PROCEDURE — 96365 THER/PROPH/DIAG IV INF INIT: CPT

## 2024-03-18 RX ORDER — HEPARIN 100 UNIT/ML
500 SYRINGE INTRAVENOUS PRN
OUTPATIENT
Start: 2024-03-21

## 2024-03-18 RX ORDER — ALBUTEROL SULFATE 90 UG/1
4 AEROSOL, METERED RESPIRATORY (INHALATION) PRN
OUTPATIENT
Start: 2024-03-21

## 2024-03-18 RX ORDER — FAMOTIDINE 10 MG/ML
20 INJECTION, SOLUTION INTRAVENOUS
OUTPATIENT
Start: 2024-03-21

## 2024-03-18 RX ORDER — DIPHENHYDRAMINE HYDROCHLORIDE 50 MG/ML
50 INJECTION INTRAMUSCULAR; INTRAVENOUS
OUTPATIENT
Start: 2024-03-21

## 2024-03-18 RX ORDER — SODIUM CHLORIDE 9 MG/ML
5-250 INJECTION, SOLUTION INTRAVENOUS PRN
Status: DISCONTINUED | OUTPATIENT
Start: 2024-03-18 | End: 2024-03-19 | Stop reason: HOSPADM

## 2024-03-18 RX ORDER — ACETAMINOPHEN 325 MG/1
650 TABLET ORAL
OUTPATIENT
Start: 2024-03-21

## 2024-03-18 RX ORDER — ONDANSETRON 2 MG/ML
8 INJECTION INTRAMUSCULAR; INTRAVENOUS
OUTPATIENT
Start: 2024-03-21

## 2024-03-18 RX ORDER — SODIUM CHLORIDE 9 MG/ML
INJECTION, SOLUTION INTRAVENOUS CONTINUOUS
OUTPATIENT
Start: 2024-03-21

## 2024-03-18 RX ORDER — SODIUM CHLORIDE 9 MG/ML
5-250 INJECTION, SOLUTION INTRAVENOUS PRN
OUTPATIENT
Start: 2024-03-21

## 2024-03-18 RX ORDER — SODIUM CHLORIDE 0.9 % (FLUSH) 0.9 %
5-40 SYRINGE (ML) INJECTION PRN
OUTPATIENT
Start: 2024-03-21

## 2024-03-18 RX ORDER — EPINEPHRINE 1 MG/ML
0.3 INJECTION, SOLUTION, CONCENTRATE INTRAVENOUS PRN
OUTPATIENT
Start: 2024-03-21

## 2024-03-18 RX ADMIN — IRON SUCROSE 300 MG: 20 INJECTION, SOLUTION INTRAVENOUS at 14:03

## 2024-03-18 NOTE — PROGRESS NOTES
Pt here for one of three Venofer infusions.   Vitals within treatment parameters, pt without any complaints, treatment authorized.   Iv access gained, treatment released and infused.   Pt states that she will wait for fifteen of thirty minute observation time. This nurse educated that we like thirty minutes, pt stated her understanding.  Pt left ambulatory without any complaints.

## 2024-04-01 ENCOUNTER — HOSPITAL ENCOUNTER (OUTPATIENT)
Dept: INFUSION THERAPY | Age: 49
Discharge: HOME | End: 2024-04-01
Payer: COMMERCIAL

## 2024-04-01 VITALS
BODY MASS INDEX: 20.59 KG/M2 | WEIGHT: 120.6 LBS | SYSTOLIC BLOOD PRESSURE: 109 MMHG | TEMPERATURE: 98.7 F | OXYGEN SATURATION: 98 % | DIASTOLIC BLOOD PRESSURE: 55 MMHG | HEIGHT: 64 IN | HEART RATE: 76 BPM

## 2024-04-01 DIAGNOSIS — K91.2 POSTSURGICAL NONABSORPTION: ICD-10-CM

## 2024-04-01 DIAGNOSIS — D50.0 IRON DEFICIENCY ANEMIA SECONDARY TO BLOOD LOSS (CHRONIC): Primary | ICD-10-CM

## 2024-04-01 PROCEDURE — 2580000003 HC RX 258: Performed by: INTERNAL MEDICINE

## 2024-04-01 PROCEDURE — 6360000002 HC RX W HCPCS: Performed by: INTERNAL MEDICINE

## 2024-04-01 PROCEDURE — 96365 THER/PROPH/DIAG IV INF INIT: CPT

## 2024-04-01 PROCEDURE — 96366 THER/PROPH/DIAG IV INF ADDON: CPT

## 2024-04-01 RX ORDER — FAMOTIDINE 10 MG/ML
20 INJECTION, SOLUTION INTRAVENOUS
OUTPATIENT
Start: 2024-04-08

## 2024-04-01 RX ORDER — ONDANSETRON 2 MG/ML
8 INJECTION INTRAMUSCULAR; INTRAVENOUS
OUTPATIENT
Start: 2024-04-08

## 2024-04-01 RX ORDER — ALBUTEROL SULFATE 90 UG/1
4 AEROSOL, METERED RESPIRATORY (INHALATION) PRN
OUTPATIENT
Start: 2024-04-08

## 2024-04-01 RX ORDER — SODIUM CHLORIDE 0.9 % (FLUSH) 0.9 %
5-40 SYRINGE (ML) INJECTION PRN
Status: DISCONTINUED | OUTPATIENT
Start: 2024-04-01 | End: 2024-04-02 | Stop reason: HOSPADM

## 2024-04-01 RX ORDER — EPINEPHRINE 1 MG/ML
0.3 INJECTION, SOLUTION, CONCENTRATE INTRAVENOUS PRN
OUTPATIENT
Start: 2024-04-08

## 2024-04-01 RX ORDER — SODIUM CHLORIDE 0.9 % (FLUSH) 0.9 %
5-40 SYRINGE (ML) INJECTION PRN
OUTPATIENT
Start: 2024-04-08

## 2024-04-01 RX ORDER — HEPARIN 100 UNIT/ML
500 SYRINGE INTRAVENOUS PRN
OUTPATIENT
Start: 2024-04-08

## 2024-04-01 RX ORDER — SODIUM CHLORIDE 9 MG/ML
5-250 INJECTION, SOLUTION INTRAVENOUS PRN
OUTPATIENT
Start: 2024-04-08

## 2024-04-01 RX ORDER — SODIUM CHLORIDE 9 MG/ML
INJECTION, SOLUTION INTRAVENOUS CONTINUOUS
OUTPATIENT
Start: 2024-04-08

## 2024-04-01 RX ORDER — DIPHENHYDRAMINE HYDROCHLORIDE 50 MG/ML
50 INJECTION INTRAMUSCULAR; INTRAVENOUS
OUTPATIENT
Start: 2024-04-08

## 2024-04-01 RX ORDER — SODIUM CHLORIDE 9 MG/ML
5-250 INJECTION, SOLUTION INTRAVENOUS PRN
Status: DISCONTINUED | OUTPATIENT
Start: 2024-04-01 | End: 2024-04-02 | Stop reason: HOSPADM

## 2024-04-01 RX ORDER — ACETAMINOPHEN 325 MG/1
650 TABLET ORAL
OUTPATIENT
Start: 2024-04-08

## 2024-04-01 RX ADMIN — SODIUM CHLORIDE 20 ML/HR: 9 INJECTION, SOLUTION INTRAVENOUS at 14:09

## 2024-04-01 RX ADMIN — SODIUM CHLORIDE 300 MG: 9 INJECTION, SOLUTION INTRAVENOUS at 14:09

## 2024-04-01 NOTE — PROGRESS NOTES
Ambulated to treatment suite for 2nd Iron infusion. Patient has no concerns at this time. PIV placed in left FA blood return noted. Treatment approved and given. Call light within reach. 15 min post-infusion monitoring completed and did not want to stay the rest of the monitoring time. Tolerated infusion without incident. Discharge instructions provided, left treatment suite ambulatory.

## 2024-04-01 NOTE — PROGRESS NOTES
Patient Name:  Zaira Joy  Patient :  1975  Patient MRN:  [id number]     Primary Oncologist: Mark Dupree MD  Referring Provider: Griffin Gu MD     Date of Service: 2024         Chief Complaint:    Chief Complaint   Patient presents with    Follow-up     FU visit.     Patient Active Problem List:     Hypothyroidism     GERD (gastroesophageal reflux disease)     Urge incontinence     Constipation     LUQ abdominal pain     S/P ventral herniorrhaphy     Numbness of hand     Postsurgical nonabsorption     Psychiatric problem     Chronic tension-type headache, not intractable     Type 1 diabetes mellitus with hyperglycemia (HCC)     Internal hernia     History of Adrian-en-Y gastric bypass     Morbid obesity due to excess calories (HCC)     Ventral incisional hernia without obstruction or gangrene     Left upper quadrant pain     LUQ pain     Persistent proteinuria     Diabetic nephropathy associated with type 1 diabetes mellitus (HCC)     Insomnia     Dyspnea     Obsessive-compulsive disorder     Bipolar 1 disorder (HCC)     Migraine without status migrainosus, not intractable     Anxiety     Painful tongue     PAD (peripheral artery disease) (HCC)     HPI:   Zaira Joy is a pleasant 49 yo female patient who was referred for evaluation of anemia of low iron on 2024.    2022 Ferritin 10.59.  2023 VILMA detected. RF wnl.  2024 creat 0.5, LFTs grossly unremarkable. TSH wnl.   WBC 5.5, Hg 9, MCV 83.6, plat 292.    2023 mammogram: 5 mm ovoid mass in the lateral superior left breast possibly a skin lesion.  Recommend further evaluation with ultrasound.  Additional mammographic images can be obtained if clinically indicated.   Benign right mammogram.   BIRADS:  BIRADS - CATEGORY 0   Incomplete: Needs Additional Imaging Evaluation   OVERALL ASSESSMENT - INCOMPLETE:NEED ADDITIONAL IMAGING EVALUATION.  She is scheduled for breast US on 2024.    One child. Mother has breast cancer.

## 2024-04-02 ENCOUNTER — TELEPHONE (OUTPATIENT)
Dept: INTERNAL MEDICINE CLINIC | Age: 49
End: 2024-04-02

## 2024-04-02 NOTE — TELEPHONE ENCOUNTER
Got a call from the patient to reschedule appt, got it moved to 4/24. Patient stated that she has been trying to get in to see dermatology, has been told several times her insurance is not wanting to pay for it and that they need a PA for it. Says dermatologist office gave her a paper to bring in to the office to fax to her insurance.

## 2024-04-03 NOTE — TELEPHONE ENCOUNTER
Spoke with the patient and she stated there was an issue with the referral and her insurance. I called [facility] to see what the issue was and spoke to Aida. She reviewed the chart and stated they have the issue fixed with her insurance and the patient can call and schedule an appointment. I informed the patient to call and get scheduled.

## 2024-04-12 ENCOUNTER — HOSPITAL ENCOUNTER (OUTPATIENT)
Dept: INFUSION THERAPY | Age: 49
Discharge: HOME | End: 2024-04-12
Payer: COMMERCIAL

## 2024-04-12 VITALS
DIASTOLIC BLOOD PRESSURE: 52 MMHG | HEART RATE: 73 BPM | HEIGHT: 64 IN | TEMPERATURE: 98.2 F | SYSTOLIC BLOOD PRESSURE: 112 MMHG | BODY MASS INDEX: 20.7 KG/M2 | OXYGEN SATURATION: 99 %

## 2024-04-12 DIAGNOSIS — K91.2 POSTSURGICAL NONABSORPTION: ICD-10-CM

## 2024-04-12 DIAGNOSIS — D50.0 IRON DEFICIENCY ANEMIA SECONDARY TO BLOOD LOSS (CHRONIC): Primary | ICD-10-CM

## 2024-04-12 PROCEDURE — 96366 THER/PROPH/DIAG IV INF ADDON: CPT

## 2024-04-12 PROCEDURE — 2580000003 HC RX 258: Performed by: INTERNAL MEDICINE

## 2024-04-12 PROCEDURE — 96365 THER/PROPH/DIAG IV INF INIT: CPT

## 2024-04-12 PROCEDURE — 6360000002 HC RX W HCPCS: Performed by: INTERNAL MEDICINE

## 2024-04-12 RX ORDER — ACETAMINOPHEN 325 MG/1
650 TABLET ORAL
OUTPATIENT
Start: 2024-04-12

## 2024-04-12 RX ORDER — FAMOTIDINE 10 MG/ML
20 INJECTION, SOLUTION INTRAVENOUS
OUTPATIENT
Start: 2024-04-12

## 2024-04-12 RX ORDER — ONDANSETRON 2 MG/ML
8 INJECTION INTRAMUSCULAR; INTRAVENOUS
OUTPATIENT
Start: 2024-04-12

## 2024-04-12 RX ORDER — SODIUM CHLORIDE 9 MG/ML
5-250 INJECTION, SOLUTION INTRAVENOUS PRN
OUTPATIENT
Start: 2024-04-12

## 2024-04-12 RX ORDER — DIPHENHYDRAMINE HYDROCHLORIDE 50 MG/ML
50 INJECTION INTRAMUSCULAR; INTRAVENOUS
OUTPATIENT
Start: 2024-04-12

## 2024-04-12 RX ORDER — SODIUM CHLORIDE 0.9 % (FLUSH) 0.9 %
5-40 SYRINGE (ML) INJECTION PRN
OUTPATIENT
Start: 2024-04-12

## 2024-04-12 RX ORDER — ALBUTEROL SULFATE 90 UG/1
4 AEROSOL, METERED RESPIRATORY (INHALATION) PRN
OUTPATIENT
Start: 2024-04-12

## 2024-04-12 RX ORDER — SODIUM CHLORIDE 9 MG/ML
INJECTION, SOLUTION INTRAVENOUS CONTINUOUS
OUTPATIENT
Start: 2024-04-12

## 2024-04-12 RX ORDER — EPINEPHRINE 1 MG/ML
0.3 INJECTION, SOLUTION, CONCENTRATE INTRAVENOUS PRN
OUTPATIENT
Start: 2024-04-12

## 2024-04-12 RX ORDER — HEPARIN 100 UNIT/ML
500 SYRINGE INTRAVENOUS PRN
OUTPATIENT
Start: 2024-04-12

## 2024-04-12 RX ORDER — SODIUM CHLORIDE 9 MG/ML
5-250 INJECTION, SOLUTION INTRAVENOUS PRN
Status: DISCONTINUED | OUTPATIENT
Start: 2024-04-12 | End: 2024-04-13 | Stop reason: HOSPADM

## 2024-04-12 RX ORDER — METRONIDAZOLE 250 MG/1
375 TABLET ORAL 3 TIMES DAILY
COMMUNITY

## 2024-04-12 RX ADMIN — SODIUM CHLORIDE 300 MG: 9 INJECTION, SOLUTION INTRAVENOUS at 14:01

## 2024-04-12 RX ADMIN — SODIUM CHLORIDE 20 ML/HR: 9 INJECTION, SOLUTION INTRAVENOUS at 14:02

## 2024-04-12 NOTE — PROGRESS NOTES
Pt here for 3/3 Venofer infusion. PIV placed with blood return noted. Pt has no concerns or issues to discuss at this time.     Infusion completed pt declined to stay full 30 minutes for observation left after 15 minutes and tolerated without incident left ambulatory declined discharge instructions

## 2024-04-24 ENCOUNTER — OFFICE VISIT (OUTPATIENT)
Dept: INTERNAL MEDICINE CLINIC | Age: 49
End: 2024-04-24
Payer: COMMERCIAL

## 2024-04-24 VITALS
SYSTOLIC BLOOD PRESSURE: 115 MMHG | HEIGHT: 64 IN | DIASTOLIC BLOOD PRESSURE: 81 MMHG | HEART RATE: 76 BPM | WEIGHT: 123 LBS | OXYGEN SATURATION: 99 % | BODY MASS INDEX: 21 KG/M2

## 2024-04-24 DIAGNOSIS — F41.9 ANXIETY: ICD-10-CM

## 2024-04-24 DIAGNOSIS — D64.9 ANEMIA, UNSPECIFIED TYPE: ICD-10-CM

## 2024-04-24 DIAGNOSIS — I73.9 PAD (PERIPHERAL ARTERY DISEASE) (HCC): ICD-10-CM

## 2024-04-24 DIAGNOSIS — E10.65 TYPE 1 DIABETES MELLITUS WITH HYPERGLYCEMIA (HCC): Primary | ICD-10-CM

## 2024-04-24 DIAGNOSIS — F31.9 BIPOLAR 1 DISORDER (HCC): ICD-10-CM

## 2024-04-24 DIAGNOSIS — E03.4 HYPOTHYROIDISM DUE TO ACQUIRED ATROPHY OF THYROID: ICD-10-CM

## 2024-04-24 DIAGNOSIS — K21.9 GASTROESOPHAGEAL REFLUX DISEASE WITHOUT ESOPHAGITIS: ICD-10-CM

## 2024-04-24 DIAGNOSIS — L73.9 FOLLICULITIS: ICD-10-CM

## 2024-04-24 DIAGNOSIS — N39.41 URGE INCONTINENCE: ICD-10-CM

## 2024-04-24 DIAGNOSIS — Z98.84 HISTORY OF ROUX-EN-Y GASTRIC BYPASS: ICD-10-CM

## 2024-04-24 DIAGNOSIS — E10.21 DIABETIC NEPHROPATHY ASSOCIATED WITH TYPE 1 DIABETES MELLITUS (HCC): ICD-10-CM

## 2024-04-24 DIAGNOSIS — F42.9 OBSESSIVE-COMPULSIVE DISORDER, UNSPECIFIED TYPE: ICD-10-CM

## 2024-04-24 DIAGNOSIS — R06.00 DYSPNEA, UNSPECIFIED TYPE: ICD-10-CM

## 2024-04-24 DIAGNOSIS — G43.909 MIGRAINE WITHOUT STATUS MIGRAINOSUS, NOT INTRACTABLE, UNSPECIFIED MIGRAINE TYPE: ICD-10-CM

## 2024-04-24 LAB
CHP ED QC CHECK: NORMAL
GLUCOSE BLD-MCNC: 93 MG/DL

## 2024-04-24 PROCEDURE — 3051F HG A1C>EQUAL 7.0%<8.0%: CPT | Performed by: INTERNAL MEDICINE

## 2024-04-24 PROCEDURE — 2022F DILAT RTA XM EVC RTNOPTHY: CPT | Performed by: INTERNAL MEDICINE

## 2024-04-24 PROCEDURE — 1036F TOBACCO NON-USER: CPT | Performed by: INTERNAL MEDICINE

## 2024-04-24 PROCEDURE — G8420 CALC BMI NORM PARAMETERS: HCPCS | Performed by: INTERNAL MEDICINE

## 2024-04-24 PROCEDURE — G8427 DOCREV CUR MEDS BY ELIG CLIN: HCPCS | Performed by: INTERNAL MEDICINE

## 2024-04-24 PROCEDURE — 82962 GLUCOSE BLOOD TEST: CPT | Performed by: INTERNAL MEDICINE

## 2024-04-24 PROCEDURE — 99214 OFFICE O/P EST MOD 30 MIN: CPT | Performed by: INTERNAL MEDICINE

## 2024-04-24 RX ORDER — OXYBUTYNIN CHLORIDE 10 MG/1
10 TABLET, EXTENDED RELEASE ORAL DAILY
Qty: 30 TABLET | Refills: 3 | Status: SHIPPED | OUTPATIENT
Start: 2024-04-24

## 2024-04-24 RX ORDER — LEVOTHYROXINE SODIUM 175 UG/1
175 TABLET ORAL DAILY
Qty: 30 TABLET | Refills: 3 | Status: SHIPPED | OUTPATIENT
Start: 2024-04-24

## 2024-04-24 RX ORDER — FERROUS SULFATE 325(65) MG
325 TABLET ORAL 2 TIMES DAILY
Qty: 180 TABLET | Refills: 1 | Status: CANCELLED | OUTPATIENT
Start: 2024-04-24

## 2024-04-24 RX ORDER — CEFUROXIME AXETIL 500 MG/1
500 TABLET ORAL 2 TIMES DAILY
Qty: 14 TABLET | Refills: 0 | Status: SHIPPED | OUTPATIENT
Start: 2024-04-24 | End: 2024-05-01

## 2024-04-24 RX ORDER — ALBUTEROL SULFATE 90 UG/1
2 AEROSOL, METERED RESPIRATORY (INHALATION) EVERY 6 HOURS PRN
Qty: 1 EACH | Refills: 3 | Status: SHIPPED | OUTPATIENT
Start: 2024-04-24

## 2024-04-24 RX ORDER — PENTOXIFYLLINE 400 MG/1
400 TABLET, EXTENDED RELEASE ORAL
Qty: 90 TABLET | Refills: 3 | Status: SHIPPED | OUTPATIENT
Start: 2024-04-24 | End: 2024-07-23

## 2024-04-24 SDOH — ECONOMIC STABILITY: FOOD INSECURITY: WITHIN THE PAST 12 MONTHS, THE FOOD YOU BOUGHT JUST DIDN'T LAST AND YOU DIDN'T HAVE MONEY TO GET MORE.: NEVER TRUE

## 2024-04-24 SDOH — ECONOMIC STABILITY: INCOME INSECURITY: HOW HARD IS IT FOR YOU TO PAY FOR THE VERY BASICS LIKE FOOD, HOUSING, MEDICAL CARE, AND HEATING?: NOT HARD AT ALL

## 2024-04-24 SDOH — ECONOMIC STABILITY: FOOD INSECURITY: WITHIN THE PAST 12 MONTHS, YOU WORRIED THAT YOUR FOOD WOULD RUN OUT BEFORE YOU GOT MONEY TO BUY MORE.: NEVER TRUE

## 2024-04-24 NOTE — PROGRESS NOTES
Obsessive-compulsive disorder, unspecified type  Continue to follow-up with her psychiatrist as per his recommendations and take medications as prescribed by him.    9. Anxiety  As mentioned above    10. Bipolar 1 disorder (HCC)  As mentioned above    11. Dyspnea, unspecified type  Continue albuterol HFA as needed  - albuterol sulfate HFA (PROVENTIL;VENTOLIN;PROAIR) 108 (90 Base) MCG/ACT inhaler; Inhale 2 puffs into the lungs every 6 hours as needed for Shortness of Breath  Dispense: 1 each; Refill: 3    12. Urge incontinence  Continue to Trabant XL  - oxyBUTYnin (DITROPAN-XL) 10 MG extended release tablet; Take 1 tablet by mouth daily  Dispense: 30 tablet; Refill: 3    13. Anemia, unspecified type  Iron deficiency anemia possibly secondary to malabsorption.  Being followed by hematologist and getting IV iron transfusion periodically    14. Folliculitis  Will treat her with Ceftin.  Advised to see her dermatologist soon.  Patient is not allergic to Ceftin as per patient  - cefUROXime (CEFTIN) 500 MG tablet; Take 1 tablet by mouth 2 times daily for 7 days  Dispense: 14 tablet; Refill: 0        Care discussed with patient. Questions answered and patient verbalizes understanding and agrees with plan.   Medications reviewed and reconciled. Continue current medications.  Appropriate prescriptions are ordered. Risks and benefits of meds are discussed.     After visit summary provided.    Advised to call for any problems, questions, or concerns.   If symptoms worsen or don't improve as expected, to call us or go to ER.    Follow up as directed, sooner if needed.      No follow-ups on file.    This dictation was performed with a verbal recognition program and it was checked for errors. It is possible that there are still dictated errors within this office note. Any errors should be brought immediately to my attention for correction. All efforts were made to ensure that this office note is accurate.     KATIUSKA ROSENBAUM MD

## 2024-04-25 ENCOUNTER — OFFICE VISIT (OUTPATIENT)
Dept: ONCOLOGY | Age: 49
End: 2024-04-25
Payer: COMMERCIAL

## 2024-04-25 ENCOUNTER — HOSPITAL ENCOUNTER (OUTPATIENT)
Dept: INFUSION THERAPY | Age: 49
Discharge: HOME | End: 2024-04-25
Payer: COMMERCIAL

## 2024-04-25 VITALS
SYSTOLIC BLOOD PRESSURE: 105 MMHG | WEIGHT: 121.8 LBS | DIASTOLIC BLOOD PRESSURE: 65 MMHG | HEART RATE: 76 BPM | BODY MASS INDEX: 20.79 KG/M2 | HEIGHT: 64 IN | TEMPERATURE: 98.5 F | OXYGEN SATURATION: 98 %

## 2024-04-25 DIAGNOSIS — D50.8 IRON DEFICIENCY ANEMIA AFTER GASTRECTOMY: ICD-10-CM

## 2024-04-25 DIAGNOSIS — D50.8 IRON DEFICIENCY ANEMIA AFTER GASTRECTOMY: Primary | ICD-10-CM

## 2024-04-25 DIAGNOSIS — K91.89 IRON DEFICIENCY ANEMIA AFTER GASTRECTOMY: ICD-10-CM

## 2024-04-25 DIAGNOSIS — K91.89 IRON DEFICIENCY ANEMIA AFTER GASTRECTOMY: Primary | ICD-10-CM

## 2024-04-25 LAB
BASOPHILS ABSOLUTE: 0.1 K/CU MM
BASOPHILS RELATIVE PERCENT: 1 % (ref 0–1)
DIFFERENTIAL TYPE: ABNORMAL
EOSINOPHILS ABSOLUTE: 0.1 K/CU MM
EOSINOPHILS RELATIVE PERCENT: 1.6 % (ref 0–3)
FERRITIN: 207 NG/ML (ref 15–150)
HCT VFR BLD CALC: 36.6 % (ref 37–47)
HEMOGLOBIN: 11.9 GM/DL (ref 12.5–16)
IRON: 80 UG/DL (ref 37–145)
LYMPHOCYTES ABSOLUTE: 2.5 K/CU MM
LYMPHOCYTES RELATIVE PERCENT: 40.4 % (ref 24–44)
MCH RBC QN AUTO: 27.7 PG (ref 27–31)
MCHC RBC AUTO-ENTMCNC: 32.5 % (ref 32–36)
MCV RBC AUTO: 85.3 FL (ref 78–100)
MONOCYTES ABSOLUTE: 0.3 K/CU MM
MONOCYTES RELATIVE PERCENT: 4.9 % (ref 0–4)
NEUTROPHILS RELATIVE PERCENT: 52.1 % (ref 36–66)
PCT TRANSFERRIN: 31 % (ref 10–44)
PDW BLD-RTO: 17.8 % (ref 11.7–14.9)
PLATELET # BLD: 324 K/CU MM (ref 140–440)
PMV BLD AUTO: 9.9 FL (ref 7.5–11.1)
RBC # BLD: 4.29 M/CU MM (ref 4.2–5.4)
SEGMENTED NEUTROPHILS ABSOLUTE COUNT: 3.2 K/CU MM
TOTAL IRON BINDING CAPACITY: 256 UG/DL (ref 250–450)
UNSATURATED IRON BINDING CAPACITY: 176 UG/DL (ref 110–370)
WBC # BLD: 6.1 K/CU MM (ref 4–10.5)

## 2024-04-25 PROCEDURE — 83550 IRON BINDING TEST: CPT

## 2024-04-25 PROCEDURE — 99211 OFF/OP EST MAY X REQ PHY/QHP: CPT

## 2024-04-25 PROCEDURE — 36415 COLL VENOUS BLD VENIPUNCTURE: CPT

## 2024-04-25 PROCEDURE — 99213 OFFICE O/P EST LOW 20 MIN: CPT | Performed by: INTERNAL MEDICINE

## 2024-04-25 PROCEDURE — 83540 ASSAY OF IRON: CPT

## 2024-04-25 PROCEDURE — 1036F TOBACCO NON-USER: CPT | Performed by: INTERNAL MEDICINE

## 2024-04-25 PROCEDURE — G8420 CALC BMI NORM PARAMETERS: HCPCS | Performed by: INTERNAL MEDICINE

## 2024-04-25 PROCEDURE — 82728 ASSAY OF FERRITIN: CPT

## 2024-04-25 PROCEDURE — G8427 DOCREV CUR MEDS BY ELIG CLIN: HCPCS | Performed by: INTERNAL MEDICINE

## 2024-04-25 PROCEDURE — 85025 COMPLETE CBC W/AUTO DIFF WBC: CPT

## 2024-04-25 ASSESSMENT — PATIENT HEALTH QUESTIONNAIRE - PHQ9
SUM OF ALL RESPONSES TO PHQ9 QUESTIONS 1 & 2: 0
SUM OF ALL RESPONSES TO PHQ QUESTIONS 1-9: 0
SUM OF ALL RESPONSES TO PHQ QUESTIONS 1-9: 0
2. FEELING DOWN, DEPRESSED OR HOPELESS: NOT AT ALL
1. LITTLE INTEREST OR PLEASURE IN DOING THINGS: NOT AT ALL
SUM OF ALL RESPONSES TO PHQ QUESTIONS 1-9: 0
SUM OF ALL RESPONSES TO PHQ QUESTIONS 1-9: 0

## 2024-04-25 NOTE — PROGRESS NOTES
MA Rooming Questions  Patient: Zaira Joy  MRN: [id number]    Date: 4/25/2024        1. Do you have any new issues?   no         2. Do you need any refills on medications?    no    3. Have you had any imaging done since your last visit?   yes - US 2/27, EGD, and Colonoscopy     4. Have you been hospitalized or seen in the emergency room since your last visit here?   no    5. Did the patient have a depression screening completed today? Yes    PHQ-9 Total Score: 0 (4/25/2024  2:26 PM)       PHQ-9 Given to (if applicable):               PHQ-9 Score (if applicable):                     [] Positive     []  Negative              Does question #9 need addressed (if applicable)                     [] Yes    []  No               Maria Victoria Hernández MA

## 2024-06-01 NOTE — PROGRESS NOTES
Patient Name:  Zaira Joy  Patient :  1975  Patient MRN:  [id number]     Primary Oncologist: Mark Dupree MD  Referring Provider: Griffin Gu MD     Date of Service: 2024       Chief Complaint:    Chief Complaint   Patient presents with    Follow-up     FU visit.     Patient Active Problem List:     Hypothyroidism     GERD (gastroesophageal reflux disease)     Urge incontinence     Constipation     LUQ abdominal pain     S/P ventral herniorrhaphy     Numbness of hand     Postsurgical nonabsorption     Psychiatric problem     Chronic tension-type headache, not intractable     Type 1 diabetes mellitus with hyperglycemia (HCC)     Internal hernia     History of Cesia-en-Y gastric bypass     Morbid obesity due to excess calories (HCC)     Ventral incisional hernia without obstruction or gangrene     Left upper quadrant pain     LUQ pain     Persistent proteinuria     Diabetic nephropathy associated with type 1 diabetes mellitus (HCC)     Insomnia     Dyspnea     Obsessive-compulsive disorder     Bipolar 1 disorder (HCC)     Migraine without status migrainosus, not intractable     Anxiety     Painful tongue     PAD (peripheral artery disease) (HCC)     HPI:   Zaira Joy is a pleasant 47 yo female patient who was referred for evaluation of anemia of low iron on 2024.  2022 Ferritin 10.59.  2023 VILMA detected. RF wnl.  2024 creat 0.5, LFTs grossly unremarkable. TSH wnl.   WBC 5.5, Hg 9, MCV 83.6, plat 292.  2023 mammogram: 5 mm ovoid mass in the lateral superior left breast possibly a skin lesion.  Recommend further evaluation with ultrasound.  Additional mammographic images can be obtained if clinically indicated.   Benign right mammogram.   OVERALL ASSESSMENT - INCOMPLETE:NEED ADDITIONAL IMAGING EVALUATION.  She is scheduled for breast US on 2024.  One child. Mother has breast cancer. Father has prostate cancer. 2 maternal aunts have breast cancer.   She has cesia and Y in

## 2024-06-04 ENCOUNTER — TELEPHONE (OUTPATIENT)
Dept: INTERNAL MEDICINE CLINIC | Age: 49
End: 2024-06-04

## 2024-06-04 ENCOUNTER — TELEPHONE (OUTPATIENT)
Dept: NEUROLOGY | Age: 49
End: 2024-06-04

## 2024-06-04 DIAGNOSIS — R20.2 PARESTHESIA OF LEFT FOOT: Primary | ICD-10-CM

## 2024-06-04 NOTE — TELEPHONE ENCOUNTER
Patient called to see if she can be seen for foot numbness d/t injury and subsequent arthritis issues.  Patient wanted to include it into her 6 month follow-up.  Informed patient to contact her PCP for new referral for new issue and because she wants an afternoon appointment and there are not any available in September patient will call back next month for follow-up appointment when October schedule is available

## 2024-06-04 NOTE — TELEPHONE ENCOUNTER
Patient having numbness in left foot.  Patient spoke to Dr. Chatman and he wanted to put her on medication.  Patient didn't want to go on the medication due to it containing Ibuprofen which caused prior allergic reaction.  Patient states that Dr. Chatman told her that he couldn't help her.  Patient called Dr. Browning as she is being treated for headaches with him. Dr. Browning's informed patient that she will need a referral to Dr. Bronwing for the numbness in her foot.

## 2024-06-19 ENCOUNTER — CLINICAL DOCUMENTATION (OUTPATIENT)
Dept: ONCOLOGY | Age: 49
End: 2024-06-19

## 2024-06-19 DIAGNOSIS — D64.9 ANEMIA, UNSPECIFIED TYPE: Primary | ICD-10-CM

## 2024-06-19 NOTE — PROGRESS NOTES
Patient called office requesting to reschedule lab draw for sooner. Patient is scheduled on 06/24/2024. Called patient @ [phone] to inquire when patient would prefer to come to clinic. Patient added to schedule for lab draw tomorrow 06/20/2024 @ 1400. Voices understanding. Denies further needs at this time.

## 2024-06-20 ENCOUNTER — HOSPITAL ENCOUNTER (OUTPATIENT)
Dept: INFUSION THERAPY | Age: 49
Discharge: HOME | End: 2024-06-20
Payer: COMMERCIAL

## 2024-06-20 DIAGNOSIS — D64.9 ANEMIA, UNSPECIFIED TYPE: ICD-10-CM

## 2024-06-20 LAB
BASOPHILS ABSOLUTE: 0 K/CU MM
BASOPHILS RELATIVE PERCENT: 0.6 % (ref 0–1)
DIFFERENTIAL TYPE: ABNORMAL
EOSINOPHILS ABSOLUTE: 0.2 K/CU MM
EOSINOPHILS RELATIVE PERCENT: 2.3 % (ref 0–3)
FERRITIN: 65 NG/ML (ref 15–150)
HCT VFR BLD CALC: 37.5 % (ref 37–47)
HEMOGLOBIN: 12.7 GM/DL (ref 12.5–16)
IRON: 80 UG/DL (ref 37–145)
LYMPHOCYTES ABSOLUTE: 2.3 K/CU MM
LYMPHOCYTES RELATIVE PERCENT: 33.2 % (ref 24–44)
MCH RBC QN AUTO: 29.1 PG (ref 27–31)
MCHC RBC AUTO-ENTMCNC: 33.9 % (ref 32–36)
MCV RBC AUTO: 86 FL (ref 78–100)
MONOCYTES ABSOLUTE: 0.4 K/CU MM
MONOCYTES RELATIVE PERCENT: 5.9 % (ref 0–4)
NEUTROPHILS ABSOLUTE: 4 K/CU MM
NEUTROPHILS RELATIVE PERCENT: 58 % (ref 36–66)
PCT TRANSFERRIN: 26 % (ref 10–44)
PDW BLD-RTO: 14.3 % (ref 11.7–14.9)
PLATELET # BLD: 297 K/CU MM (ref 140–440)
PMV BLD AUTO: 9.8 FL (ref 7.5–11.1)
RBC # BLD: 4.36 M/CU MM (ref 4.2–5.4)
TOTAL IRON BINDING CAPACITY: 308 UG/DL (ref 250–450)
UNSATURATED IRON BINDING CAPACITY: 228 UG/DL (ref 110–370)
WBC # BLD: 7 K/CU MM (ref 4–10.5)

## 2024-06-20 PROCEDURE — 82728 ASSAY OF FERRITIN: CPT

## 2024-06-20 PROCEDURE — 36415 COLL VENOUS BLD VENIPUNCTURE: CPT

## 2024-06-20 PROCEDURE — 83550 IRON BINDING TEST: CPT

## 2024-06-20 PROCEDURE — 83540 ASSAY OF IRON: CPT

## 2024-06-20 PROCEDURE — 85025 COMPLETE CBC W/AUTO DIFF WBC: CPT

## 2024-07-01 ENCOUNTER — OFFICE VISIT (OUTPATIENT)
Dept: ONCOLOGY | Age: 49
End: 2024-07-01
Payer: COMMERCIAL

## 2024-07-01 ENCOUNTER — HOSPITAL ENCOUNTER (OUTPATIENT)
Dept: INFUSION THERAPY | Age: 49
Discharge: HOME | End: 2024-07-01
Payer: COMMERCIAL

## 2024-07-01 VITALS
OXYGEN SATURATION: 97 % | HEART RATE: 76 BPM | TEMPERATURE: 98.2 F | BODY MASS INDEX: 21.24 KG/M2 | HEIGHT: 64 IN | SYSTOLIC BLOOD PRESSURE: 130 MMHG | DIASTOLIC BLOOD PRESSURE: 76 MMHG | WEIGHT: 124.4 LBS

## 2024-07-01 DIAGNOSIS — K91.89 IRON DEFICIENCY ANEMIA AFTER GASTRECTOMY: Primary | ICD-10-CM

## 2024-07-01 DIAGNOSIS — D50.8 IRON DEFICIENCY ANEMIA AFTER GASTRECTOMY: Primary | ICD-10-CM

## 2024-07-01 PROCEDURE — G8420 CALC BMI NORM PARAMETERS: HCPCS | Performed by: INTERNAL MEDICINE

## 2024-07-01 PROCEDURE — 1036F TOBACCO NON-USER: CPT | Performed by: INTERNAL MEDICINE

## 2024-07-01 PROCEDURE — G8427 DOCREV CUR MEDS BY ELIG CLIN: HCPCS | Performed by: INTERNAL MEDICINE

## 2024-07-01 PROCEDURE — 99211 OFF/OP EST MAY X REQ PHY/QHP: CPT

## 2024-07-01 PROCEDURE — 99213 OFFICE O/P EST LOW 20 MIN: CPT | Performed by: INTERNAL MEDICINE

## 2024-07-01 RX ORDER — BACLOFEN 20 MG/1
20 TABLET ORAL 3 TIMES DAILY PRN
COMMUNITY
Start: 2024-06-04

## 2024-07-01 RX ORDER — FERROUS GLUCONATE 324(38)MG
324 TABLET ORAL
Qty: 30 TABLET | Refills: 3 | Status: SHIPPED | OUTPATIENT
Start: 2024-07-01

## 2024-07-01 NOTE — PROGRESS NOTES
MA Rooming Questions  Patient: Zaira Joy  MRN: [id number]    Date: 7/1/2024        1. Do you have any new issues?   Yes - Fatigue, craving beans       2. Do you need any refills on medications?    no    3. Have you had any imaging done since your last visit?   no    4. Have you been hospitalized or seen in the emergency room since your last visit here?   no    5. Did the patient have a depression screening completed today? No    No data recorded     PHQ-9 Given to (if applicable):               PHQ-9 Score (if applicable):                     [] Positive     []  Negative              Does question #9 need addressed (if applicable)                     [] Yes    []  No               Julieth Rothman CMA

## 2024-07-09 ENCOUNTER — OFFICE VISIT (OUTPATIENT)
Dept: NEUROLOGY | Age: 49
End: 2024-07-09
Payer: COMMERCIAL

## 2024-07-09 VITALS
WEIGHT: 123.6 LBS | DIASTOLIC BLOOD PRESSURE: 68 MMHG | HEIGHT: 64 IN | BODY MASS INDEX: 21.1 KG/M2 | HEART RATE: 77 BPM | OXYGEN SATURATION: 98 % | SYSTOLIC BLOOD PRESSURE: 122 MMHG

## 2024-07-09 DIAGNOSIS — R20.0 NUMBNESS OF LEFT FOOT: ICD-10-CM

## 2024-07-09 DIAGNOSIS — G43.909 MIGRAINE WITHOUT STATUS MIGRAINOSUS, NOT INTRACTABLE, UNSPECIFIED MIGRAINE TYPE: ICD-10-CM

## 2024-07-09 DIAGNOSIS — G57.32 SUPERFICIAL PERONEAL NERVE NEUROPATHY, LEFT: Primary | ICD-10-CM

## 2024-07-09 PROCEDURE — 1036F TOBACCO NON-USER: CPT | Performed by: PSYCHIATRY & NEUROLOGY

## 2024-07-09 PROCEDURE — G8427 DOCREV CUR MEDS BY ELIG CLIN: HCPCS | Performed by: PSYCHIATRY & NEUROLOGY

## 2024-07-09 PROCEDURE — 99215 OFFICE O/P EST HI 40 MIN: CPT | Performed by: PSYCHIATRY & NEUROLOGY

## 2024-07-09 PROCEDURE — G8420 CALC BMI NORM PARAMETERS: HCPCS | Performed by: PSYCHIATRY & NEUROLOGY

## 2024-07-09 NOTE — PROGRESS NOTES
strength normal, normal pursuit, no nystagmus, and no ptosis   Facial Motor: normal facial motor  Motor/Coordination Exam   Power: motor strength appears intact throughout, no arm drift, and normal tone   Coordination: normal finger-to-nose, forearm rotation intact, and rapid alternating movement normal  Gait and Stance   Gait/Posture: station normal, casual gait normal, and ambulates independently   Sensory: Bilateral vibratory sense decreased in the lower extremities to a moderate degree, worse at the left toe than the right toe.  Decreased pinprick of the first second and third toes and the medial dorsal portion of the left foot below the ankle.  Deep tendon reflexes: 2/4 upper extremities, 0/4 lower extremities      /68 (Site: Left Upper Arm, Position: Sitting, Cuff Size: Medium Adult)   Pulse 77   Ht 1.626 m (5' 4\")   Wt 56.1 kg (123 lb 9.6 oz)   SpO2 98%   BMI 21.22 kg/m²     Assessment and Plan     Diagnosis Orders   1. Superficial peroneal nerve neuropathy, left  Nerve Conduction Test with EMG      2. Numbness of left foot  Nerve Conduction Test with EMG      3. Migraine without status migrainosus, not intractable, unspecified migraine type  topiramate (TOPAMAX) 200 MG tablet          1) left foot numbness, status post injury to the left foot in April 2024.  Suspect this is likely a branch of the superficial peroneal nerve.  Will obtain an EMG to evaluate this in better detail.    2) suspect there is also an underlying degree of peripheral neuropathy evident which was reflected on exam.  This is likely due to diabetes.  She also follows with hematology for some malabsorption of iron after gastric bypass and small bowel resection.  Copper, folate, and vitamin B12 have been normal.    3) migraines are well-controlled on Topamax 200 mg daily for which she will continue.      I spent >40 minutes total time regarding this patient's encounter.  This included obtaining history, performing a neurological

## 2024-07-23 ENCOUNTER — OFFICE VISIT (OUTPATIENT)
Dept: INTERNAL MEDICINE CLINIC | Age: 49
End: 2024-07-23
Payer: COMMERCIAL

## 2024-07-23 VITALS
HEART RATE: 71 BPM | WEIGHT: 126 LBS | OXYGEN SATURATION: 98 % | SYSTOLIC BLOOD PRESSURE: 130 MMHG | DIASTOLIC BLOOD PRESSURE: 72 MMHG | BODY MASS INDEX: 21.63 KG/M2

## 2024-07-23 DIAGNOSIS — F41.9 ANXIETY: ICD-10-CM

## 2024-07-23 DIAGNOSIS — Z23 NEED FOR PNEUMOCOCCAL 20-VALENT CONJUGATE VACCINATION: ICD-10-CM

## 2024-07-23 DIAGNOSIS — B35.1 TOENAIL FUNGUS: ICD-10-CM

## 2024-07-23 DIAGNOSIS — K59.00 CONSTIPATION, UNSPECIFIED CONSTIPATION TYPE: ICD-10-CM

## 2024-07-23 DIAGNOSIS — R06.00 DYSPNEA, UNSPECIFIED TYPE: ICD-10-CM

## 2024-07-23 DIAGNOSIS — Z98.84 HISTORY OF ROUX-EN-Y GASTRIC BYPASS: ICD-10-CM

## 2024-07-23 DIAGNOSIS — K21.9 GASTROESOPHAGEAL REFLUX DISEASE WITHOUT ESOPHAGITIS: ICD-10-CM

## 2024-07-23 DIAGNOSIS — L73.9 FOLLICULITIS: ICD-10-CM

## 2024-07-23 DIAGNOSIS — E10.21 DIABETIC NEPHROPATHY ASSOCIATED WITH TYPE 1 DIABETES MELLITUS (HCC): ICD-10-CM

## 2024-07-23 DIAGNOSIS — R11.0 CHRONIC NAUSEA: ICD-10-CM

## 2024-07-23 DIAGNOSIS — F42.9 OBSESSIVE-COMPULSIVE DISORDER, UNSPECIFIED TYPE: ICD-10-CM

## 2024-07-23 DIAGNOSIS — E03.4 HYPOTHYROIDISM DUE TO ACQUIRED ATROPHY OF THYROID: ICD-10-CM

## 2024-07-23 DIAGNOSIS — N39.41 URGE INCONTINENCE: ICD-10-CM

## 2024-07-23 DIAGNOSIS — G43.909 MIGRAINE WITHOUT STATUS MIGRAINOSUS, NOT INTRACTABLE, UNSPECIFIED MIGRAINE TYPE: ICD-10-CM

## 2024-07-23 DIAGNOSIS — I73.9 PAD (PERIPHERAL ARTERY DISEASE) (HCC): ICD-10-CM

## 2024-07-23 DIAGNOSIS — E10.65 TYPE 1 DIABETES MELLITUS WITH HYPERGLYCEMIA (HCC): Primary | ICD-10-CM

## 2024-07-23 DIAGNOSIS — F31.9 BIPOLAR 1 DISORDER (HCC): ICD-10-CM

## 2024-07-23 DIAGNOSIS — D64.9 ANEMIA, UNSPECIFIED TYPE: ICD-10-CM

## 2024-07-23 PROCEDURE — 99214 OFFICE O/P EST MOD 30 MIN: CPT | Performed by: INTERNAL MEDICINE

## 2024-07-23 PROCEDURE — 3051F HG A1C>EQUAL 7.0%<8.0%: CPT | Performed by: INTERNAL MEDICINE

## 2024-07-23 PROCEDURE — G8427 DOCREV CUR MEDS BY ELIG CLIN: HCPCS | Performed by: INTERNAL MEDICINE

## 2024-07-23 PROCEDURE — G8420 CALC BMI NORM PARAMETERS: HCPCS | Performed by: INTERNAL MEDICINE

## 2024-07-23 PROCEDURE — 1036F TOBACCO NON-USER: CPT | Performed by: INTERNAL MEDICINE

## 2024-07-23 PROCEDURE — 2022F DILAT RTA XM EVC RTNOPTHY: CPT | Performed by: INTERNAL MEDICINE

## 2024-07-23 RX ORDER — OXYBUTYNIN CHLORIDE 10 MG/1
10 TABLET, EXTENDED RELEASE ORAL DAILY
Qty: 30 TABLET | Refills: 3 | Status: SHIPPED | OUTPATIENT
Start: 2024-07-23

## 2024-07-23 RX ORDER — CICLOPIROX 80 MG/ML
SOLUTION TOPICAL
Qty: 1 EACH | Refills: 1 | Status: SHIPPED | OUTPATIENT
Start: 2024-07-23

## 2024-07-23 RX ORDER — TIZANIDINE 4 MG/1
4 TABLET ORAL EVERY 12 HOURS PRN
COMMUNITY
Start: 2024-07-11

## 2024-07-23 RX ORDER — ONDANSETRON 4 MG/1
TABLET, ORALLY DISINTEGRATING ORAL
Qty: 30 TABLET | Refills: 0 | Status: SHIPPED | OUTPATIENT
Start: 2024-07-23

## 2024-07-23 RX ORDER — PENTOXIFYLLINE 400 MG/1
400 TABLET, EXTENDED RELEASE ORAL
Qty: 90 TABLET | Refills: 3 | Status: SHIPPED | OUTPATIENT
Start: 2024-07-23 | End: 2024-10-21

## 2024-07-23 RX ORDER — LEVOTHYROXINE SODIUM 175 UG/1
175 TABLET ORAL DAILY
Qty: 30 TABLET | Refills: 3 | Status: SHIPPED | OUTPATIENT
Start: 2024-07-23

## 2024-07-23 RX ORDER — ALBUTEROL SULFATE 90 UG/1
2 AEROSOL, METERED RESPIRATORY (INHALATION) EVERY 6 HOURS PRN
Qty: 1 EACH | Refills: 3 | Status: SHIPPED | OUTPATIENT
Start: 2024-07-23

## 2024-07-23 NOTE — PROGRESS NOTES
Name: Zaira Joy  [id number]  Age: 49 y.o.  YOB: 1975  Sex: female    CHIEF COMPLAINT:    Chief Complaint   Patient presents with    Nail Problem     States she has \"mold\" on her nails from a DIY nail kit    Referral - General     Rheumatology (Not Dr. Chatman)       HISTORY OF PRESENT ILLNESS:     This is a pleasant  49 y.o. female  is seen today for management of chronic medical problems and medications refills.  Previous records reviewed .    Complains of toenail fungus and fingernail fungus,  small area on both sites from last few days.    Patient was complaining of numbness of the left foot recently.  She was seen by Dr. Browning, neurologist.  He is ordering EMG.  He is also treating her for migraine and she is using Topamax for migraine.  Dr. San is seeing her for iron deficiency anemia.  She has Adrian-en-Y in 2015.  She had a EGD and colonoscopy in March 2024.  Dr. Hanson recommended a repeat colonoscopy in 6 months.  She got Venofer and fusions.,  Last infusion was in April 2024  She is also on ferrous gluconate now.    She claims she is being followed by endocrinologist in [place], Dr. Noguera for her diabetes type I.    She sees Dr. Sanchez for proteinuria but avoiding ACE or ARB's because of  Hypotension.  She also sees a psychiatrist , Dr. Garcia for her bipolar disorder and anxiety / depression.     Patient developed spots on her tongue several months ago  and some pain and was treated with nystatin swish and swallow but did not help her.  She went to ENT and they gave her magic mouth wash and this did not help her either.  Dr. Chatman advised to take Folic acid and this did  not help her either.  ENT NP  thought that it is reaction to  some medication and advised to get biopsy if not better.  Patient claims that her soreness of her tongue is very mild and she is used to it now and does not want biopsy at this time.        Doing OK otherwise  Denies CP or SOB.     Denies any

## 2024-07-24 ENCOUNTER — HOSPITAL ENCOUNTER (OUTPATIENT)
Age: 49
Discharge: HOME | End: 2024-07-24
Payer: COMMERCIAL

## 2024-07-24 DIAGNOSIS — E03.4 HYPOTHYROIDISM DUE TO ACQUIRED ATROPHY OF THYROID: ICD-10-CM

## 2024-07-24 DIAGNOSIS — E10.65 TYPE 1 DIABETES MELLITUS WITH HYPERGLYCEMIA (HCC): ICD-10-CM

## 2024-07-24 LAB
ALBUMIN SERPL-MCNC: 3.8 GM/DL (ref 3.4–5)
ALP BLD-CCNC: 112 IU/L (ref 40–128)
ALT SERPL-CCNC: 27 U/L (ref 10–40)
ANION GAP SERPL CALCULATED.3IONS-SCNC: 10 MMOL/L (ref 7–16)
AST SERPL-CCNC: 26 IU/L (ref 15–37)
BILIRUB SERPL-MCNC: 0.2 MG/DL (ref 0–1)
BUN SERPL-MCNC: 11 MG/DL (ref 6–23)
CALCIUM SERPL-MCNC: 9 MG/DL (ref 8.3–10.6)
CHLORIDE BLD-SCNC: 108 MMOL/L (ref 99–110)
CHOLESTEROL, FASTING: 136 MG/DL
CO2: 23 MMOL/L (ref 21–32)
CREAT SERPL-MCNC: 0.6 MG/DL (ref 0.6–1.1)
ESTIMATED AVERAGE GLUCOSE: 180 MG/DL
GFR, ESTIMATED: >90 ML/MIN/1.73M2
GLUCOSE SERPL-MCNC: 124 MG/DL (ref 70–99)
HBA1C MFR BLD: 7.9 % (ref 4.2–6.3)
HDLC SERPL-MCNC: 50 MG/DL
LDLC SERPL CALC-MCNC: 66 MG/DL
POTASSIUM SERPL-SCNC: 4.1 MMOL/L (ref 3.5–5.1)
SODIUM BLD-SCNC: 141 MMOL/L (ref 135–145)
TOTAL PROTEIN: 6.5 GM/DL (ref 6.4–8.2)
TRIGLYCERIDE, FASTING: 100 MG/DL
TSH SERPL DL<=0.005 MIU/L-ACNC: 0.37 UIU/ML (ref 0.27–4.2)

## 2024-07-24 PROCEDURE — 80061 LIPID PANEL: CPT

## 2024-07-24 PROCEDURE — 82570 ASSAY OF URINE CREATININE: CPT

## 2024-07-24 PROCEDURE — 80053 COMPREHEN METABOLIC PANEL: CPT

## 2024-07-24 PROCEDURE — 82043 UR ALBUMIN QUANTITATIVE: CPT

## 2024-07-24 PROCEDURE — 84443 ASSAY THYROID STIM HORMONE: CPT

## 2024-07-24 PROCEDURE — 36415 COLL VENOUS BLD VENIPUNCTURE: CPT

## 2024-07-24 PROCEDURE — 83036 HEMOGLOBIN GLYCOSYLATED A1C: CPT

## 2024-07-25 ENCOUNTER — PATIENT MESSAGE (OUTPATIENT)
Dept: INTERNAL MEDICINE CLINIC | Age: 49
End: 2024-07-25

## 2024-07-25 DIAGNOSIS — R39.9 UTI SYMPTOMS: Primary | ICD-10-CM

## 2024-07-25 LAB
CREAT UR-MCNC: 42.8 MG/DL (ref 28–217)
MICROALBUMIN 24H UR-MCNC: 1.4 MG/DL
MICROALBUMIN/CREAT UR-RTO: 32.7 MG/G CREAT (ref 0–30)

## 2024-07-26 ENCOUNTER — TELEPHONE (OUTPATIENT)
Dept: INTERNAL MEDICINE CLINIC | Age: 49
End: 2024-07-26

## 2024-07-26 NOTE — TELEPHONE ENCOUNTER
I spoke with Lucie the NP and I told her that the Pt though the Urine sample she gave was for a UTI and it was not so I called over to the lab to see if they could run a urinalysis but they could not. I called the Pt to see if she could come in and give us a urine sample she stated she could not but I did advise her to go to the walk in clinic or urgent care over the weekend and if she was not able to go and still having issues Monday to call and we will put her on. No further action needed

## 2024-07-26 NOTE — TELEPHONE ENCOUNTER
Patient called in stating that a rx was called in for her bladder infection she thinks it was Cipro.   She went to pharmacy and her insurance will not cover it and she states its too expensive for her.  Patient would like an alternative called to [facility] on [address].

## 2024-07-26 NOTE — TELEPHONE ENCOUNTER
From: Zaira Joy  To: Dr. Griffin Gu  Sent: 7/25/2024 7:43 PM EDT  Subject: Antibiotic     Hi, the office sent over an antibiotic to my pharmacy, I'm guessing for a UTI or bladder infection. It's very expensive I don't know why it's not covered. Is there anything else I could get? Also if it was ecoli positive, Cefdinir is typically the only one that works but I didn't see any of that in the results. Either way I can't afford what's at the pharmacy now.  Thank you.

## 2024-08-07 DIAGNOSIS — R06.00 DYSPNEA, UNSPECIFIED TYPE: ICD-10-CM

## 2024-08-07 RX ORDER — ALBUTEROL SULFATE 90 UG/1
AEROSOL, METERED RESPIRATORY (INHALATION)
Qty: 18 G | Refills: 0 | Status: SHIPPED | OUTPATIENT
Start: 2024-08-07

## 2024-08-13 NOTE — PROGRESS NOTES
Patient Name:  Zaira Joy  Patient :  1975  Patient MRN:  [id number]     Primary Oncologist: Mark Dupree MD  Referring Provider: Griffin Gu MD     Date of Service: 2024       Chief Complaint:    No chief complaint on file.    FU visit.     Patient Active Problem List:     Hypothyroidism     GERD (gastroesophageal reflux disease)     Urge incontinence     Constipation     LUQ abdominal pain     S/P ventral herniorrhaphy     Numbness of hand     Postsurgical nonabsorption     Psychiatric problem     Chronic tension-type headache, not intractable     Type 1 diabetes mellitus with hyperglycemia (HCC)     Internal hernia     History of Cesia-en-Y gastric bypass     Morbid obesity due to excess calories (HCC)     Ventral incisional hernia without obstruction or gangrene     Left upper quadrant pain     LUQ pain     Persistent proteinuria     Diabetic nephropathy associated with type 1 diabetes mellitus (HCC)     Insomnia     Dyspnea     Obsessive-compulsive disorder     Bipolar 1 disorder (HCC)     Migraine without status migrainosus, not intractable     Anxiety     Painful tongue     PAD (peripheral artery disease) (HCC)     HPI:   Zaira Joy is a pleasant 49 yo female patient who was referred for evaluation of anemia of low iron on 2024.  2022 Ferritin 10.59.  2023 VILMA detected. RF wnl.  2024 creat 0.5, LFTs grossly unremarkable. TSH wnl.   WBC 5.5, Hg 9, MCV 83.6, plat 292.  2023 mammogram: 5 mm ovoid mass in the lateral superior left breast possibly a skin lesion.  Recommend further evaluation with ultrasound.  Additional mammographic images can be obtained if clinically indicated.   Benign right mammogram.   OVERALL ASSESSMENT - INCOMPLETE:NEED ADDITIONAL IMAGING EVALUATION.  She is scheduled for breast US on 2024.  One child. Mother has breast cancer. Father has prostate cancer. 2 maternal aunts have breast cancer.   She has cesia and Y in 2015. She has multiple

## 2024-08-27 ENCOUNTER — HOSPITAL ENCOUNTER (OUTPATIENT)
Age: 49
Setting detail: SPECIMEN
Discharge: HOME | End: 2024-08-27
Payer: COMMERCIAL

## 2024-08-27 ENCOUNTER — OFFICE VISIT (OUTPATIENT)
Dept: OBGYN | Age: 49
End: 2024-08-27
Payer: COMMERCIAL

## 2024-08-27 VITALS
SYSTOLIC BLOOD PRESSURE: 121 MMHG | DIASTOLIC BLOOD PRESSURE: 82 MMHG | HEART RATE: 83 BPM | BODY MASS INDEX: 20.49 KG/M2 | HEIGHT: 64 IN | WEIGHT: 120 LBS

## 2024-08-27 DIAGNOSIS — Z12.31 BREAST CANCER SCREENING BY MAMMOGRAM: ICD-10-CM

## 2024-08-27 DIAGNOSIS — Z80.3 FAMILY HISTORY OF BREAST CANCER IN MOTHER: ICD-10-CM

## 2024-08-27 DIAGNOSIS — Z01.419 WOMEN'S ANNUAL ROUTINE GYNECOLOGICAL EXAMINATION: Primary | ICD-10-CM

## 2024-08-27 PROCEDURE — 87624 HPV HI-RISK TYP POOLED RSLT: CPT

## 2024-08-27 PROCEDURE — G0101 CA SCREEN;PELVIC/BREAST EXAM: HCPCS | Performed by: NURSE PRACTITIONER

## 2024-08-27 PROCEDURE — 88142 CYTOPATH C/V THIN LAYER: CPT

## 2024-08-27 ASSESSMENT — ENCOUNTER SYMPTOMS
GASTROINTESTINAL NEGATIVE: 1
ABDOMINAL PAIN: 0
CONSTIPATION: 0
VOMITING: 0
SHORTNESS OF BREATH: 0
DIARRHEA: 0
RESPIRATORY NEGATIVE: 1
NAUSEA: 0

## 2024-08-28 ENCOUNTER — HOSPITAL ENCOUNTER (OUTPATIENT)
Dept: INFUSION THERAPY | Age: 49
Discharge: HOME | End: 2024-08-28
Payer: COMMERCIAL

## 2024-08-28 DIAGNOSIS — D50.8 IRON DEFICIENCY ANEMIA AFTER GASTRECTOMY: ICD-10-CM

## 2024-08-28 DIAGNOSIS — K91.89 IRON DEFICIENCY ANEMIA AFTER GASTRECTOMY: ICD-10-CM

## 2024-08-28 LAB
BASOPHILS ABSOLUTE: 0 K/CU MM
BASOPHILS RELATIVE PERCENT: 0.3 % (ref 0–1)
DIFFERENTIAL TYPE: ABNORMAL
EOSINOPHILS ABSOLUTE: 0.1 K/CU MM
EOSINOPHILS RELATIVE PERCENT: 1.6 % (ref 0–3)
FERRITIN: 57 NG/ML (ref 15–150)
HCT VFR BLD CALC: 36.3 % (ref 37–47)
HEMOGLOBIN: 12.6 GM/DL (ref 12.5–16)
IRON: 68 UG/DL (ref 37–145)
LYMPHOCYTES ABSOLUTE: 2.7 K/CU MM
LYMPHOCYTES RELATIVE PERCENT: 38.8 % (ref 24–44)
MCH RBC QN AUTO: 30.4 PG (ref 27–31)
MCHC RBC AUTO-ENTMCNC: 34.7 % (ref 32–36)
MCV RBC AUTO: 87.5 FL (ref 78–100)
MONOCYTES ABSOLUTE: 0.4 K/CU MM
MONOCYTES RELATIVE PERCENT: 5.2 % (ref 0–4)
NEUTROPHILS ABSOLUTE: 3.8 K/CU MM
NEUTROPHILS RELATIVE PERCENT: 54.1 % (ref 36–66)
PCT TRANSFERRIN: 26 % (ref 10–44)
PDW BLD-RTO: 12.3 % (ref 11.7–14.9)
PLATELET # BLD: 313 K/CU MM (ref 140–440)
PMV BLD AUTO: 9.6 FL (ref 7.5–11.1)
RBC # BLD: 4.15 M/CU MM (ref 4.2–5.4)
TOTAL IRON BINDING CAPACITY: 263 UG/DL (ref 250–450)
UNSATURATED IRON BINDING CAPACITY: 195 UG/DL (ref 110–370)
WBC # BLD: 6.9 K/CU MM (ref 4–10.5)

## 2024-08-28 PROCEDURE — 83540 ASSAY OF IRON: CPT

## 2024-08-28 PROCEDURE — 85025 COMPLETE CBC W/AUTO DIFF WBC: CPT

## 2024-08-28 PROCEDURE — 82728 ASSAY OF FERRITIN: CPT

## 2024-08-28 PROCEDURE — 82525 ASSAY OF COPPER: CPT

## 2024-08-28 PROCEDURE — 84630 ASSAY OF ZINC: CPT

## 2024-08-28 PROCEDURE — 36415 COLL VENOUS BLD VENIPUNCTURE: CPT

## 2024-08-28 PROCEDURE — 83550 IRON BINDING TEST: CPT

## 2024-08-30 LAB
COPPER SERPL-MCNC: 96 UG/DL (ref 80–155)
HPV HIGH RISK: NOT DETECTED
ZINC SERPL-MCNC: 48.3 UG/DL (ref 60–120)

## 2024-09-03 RX ORDER — GINSENG 100 MG
50 CAPSULE ORAL DAILY
Qty: 30 TABLET | Refills: 5 | Status: SHIPPED | OUTPATIENT
Start: 2024-09-03

## 2024-09-03 NOTE — PROGRESS NOTES
This nurse called the patient @ [phone] to review lab results. However there was no answer. This nurse left a VM for the patient advising of her zinc level and that Dr Flores would like for her to take Zinc 50 mg daily. Rx e-scribed to [facility] pharmacy per physician order

## 2024-09-04 ENCOUNTER — OFFICE VISIT (OUTPATIENT)
Dept: ONCOLOGY | Age: 49
End: 2024-09-04
Payer: COMMERCIAL

## 2024-09-04 ENCOUNTER — HOSPITAL ENCOUNTER (OUTPATIENT)
Dept: INFUSION THERAPY | Age: 49
Discharge: HOME | End: 2024-09-04
Payer: COMMERCIAL

## 2024-09-04 VITALS
RESPIRATION RATE: 16 BRPM | OXYGEN SATURATION: 99 % | HEART RATE: 68 BPM | HEIGHT: 64 IN | DIASTOLIC BLOOD PRESSURE: 67 MMHG | TEMPERATURE: 98.2 F | BODY MASS INDEX: 20.69 KG/M2 | WEIGHT: 121.2 LBS | SYSTOLIC BLOOD PRESSURE: 120 MMHG

## 2024-09-04 DIAGNOSIS — D64.9 ANEMIA, UNSPECIFIED TYPE: Primary | ICD-10-CM

## 2024-09-04 DIAGNOSIS — D50.8 IRON DEFICIENCY ANEMIA AFTER GASTRECTOMY: ICD-10-CM

## 2024-09-04 DIAGNOSIS — K91.89 IRON DEFICIENCY ANEMIA AFTER GASTRECTOMY: ICD-10-CM

## 2024-09-04 PROCEDURE — 99213 OFFICE O/P EST LOW 20 MIN: CPT | Performed by: INTERNAL MEDICINE

## 2024-09-04 PROCEDURE — 99211 OFF/OP EST MAY X REQ PHY/QHP: CPT

## 2024-09-04 PROCEDURE — G8420 CALC BMI NORM PARAMETERS: HCPCS | Performed by: INTERNAL MEDICINE

## 2024-09-04 PROCEDURE — 1036F TOBACCO NON-USER: CPT | Performed by: INTERNAL MEDICINE

## 2024-09-04 PROCEDURE — G8427 DOCREV CUR MEDS BY ELIG CLIN: HCPCS | Performed by: INTERNAL MEDICINE

## 2024-09-04 RX ORDER — GINSENG 100 MG
50 CAPSULE ORAL DAILY
Qty: 30 TABLET | Refills: 1 | Status: SHIPPED | OUTPATIENT
Start: 2024-09-04

## 2024-09-04 NOTE — PROGRESS NOTES
MA Rooming Questions  Patient: Zaira Joy  MRN: [id number]    Date: 9/4/2024        1. Do you have any new issues?   yes - fatigue, thinks she started menopause, hot flashes, stopped taking iron due to making her constipated.          2. Do you need any refills on medications?    no    3. Have you had any imaging done since your last visit?   yes - capsule endoscopy    4. Have you been hospitalized or seen in the emergency room since your last visit here?   no    5. Did the patient have a depression screening completed today? No    No data recorded     PHQ-9 Given to (if applicable):               PHQ-9 Score (if applicable):                     [] Positive     []  Negative              Does question #9 need addressed (if applicable)                     [] Yes    []  No               Nena Bearden CMA

## 2024-09-19 DIAGNOSIS — R06.00 DYSPNEA, UNSPECIFIED TYPE: ICD-10-CM

## 2024-09-19 DIAGNOSIS — R11.0 CHRONIC NAUSEA: ICD-10-CM

## 2024-09-20 RX ORDER — ALBUTEROL SULFATE 90 UG/1
INHALANT RESPIRATORY (INHALATION)
Qty: 18 G | Refills: 0 | Status: SHIPPED | OUTPATIENT
Start: 2024-09-20

## 2024-09-20 RX ORDER — ONDANSETRON 4 MG/1
TABLET, ORALLY DISINTEGRATING ORAL
Qty: 30 TABLET | Refills: 0 | Status: SHIPPED | OUTPATIENT
Start: 2024-09-20

## 2024-10-01 DIAGNOSIS — D64.9 ANEMIA, UNSPECIFIED TYPE: Primary | ICD-10-CM

## 2024-10-02 DIAGNOSIS — K90.9 INTESTINAL MALABSORPTION, UNSPECIFIED TYPE: Primary | ICD-10-CM

## 2024-10-02 RX ORDER — SODIUM CHLORIDE 0.9 % (FLUSH) 0.9 %
5-40 SYRINGE (ML) INJECTION PRN
OUTPATIENT
Start: 2024-10-02

## 2024-10-02 RX ORDER — SODIUM CHLORIDE 9 MG/ML
INJECTION, SOLUTION INTRAVENOUS CONTINUOUS
OUTPATIENT
Start: 2024-10-02

## 2024-10-02 RX ORDER — ACETAMINOPHEN 325 MG/1
650 TABLET ORAL ONCE
OUTPATIENT
Start: 2024-10-02 | End: 2024-10-02

## 2024-10-02 RX ORDER — EPINEPHRINE 1 MG/ML
0.3 INJECTION, SOLUTION, CONCENTRATE INTRAVENOUS PRN
OUTPATIENT
Start: 2024-10-02

## 2024-10-02 RX ORDER — HEPARIN SODIUM (PORCINE) LOCK FLUSH IV SOLN 100 UNIT/ML 100 UNIT/ML
500 SOLUTION INTRAVENOUS PRN
OUTPATIENT
Start: 2024-10-02

## 2024-10-02 RX ORDER — ALBUTEROL SULFATE 90 UG/1
4 INHALANT RESPIRATORY (INHALATION) PRN
OUTPATIENT
Start: 2024-10-02

## 2024-10-02 RX ORDER — DIPHENHYDRAMINE HYDROCHLORIDE 50 MG/ML
50 INJECTION INTRAMUSCULAR; INTRAVENOUS
OUTPATIENT
Start: 2024-10-02

## 2024-10-02 RX ORDER — FAMOTIDINE 10 MG/ML
20 INJECTION, SOLUTION INTRAVENOUS
OUTPATIENT
Start: 2024-10-02

## 2024-10-02 RX ORDER — ONDANSETRON 2 MG/ML
8 INJECTION INTRAMUSCULAR; INTRAVENOUS
OUTPATIENT
Start: 2024-10-02

## 2024-10-02 RX ORDER — SODIUM CHLORIDE 9 MG/ML
5-250 INJECTION, SOLUTION INTRAVENOUS PRN
OUTPATIENT
Start: 2024-10-02

## 2024-10-02 RX ORDER — ACETAMINOPHEN 325 MG/1
650 TABLET ORAL
OUTPATIENT
Start: 2024-10-02